# Patient Record
Sex: MALE | Race: WHITE | URBAN - METROPOLITAN AREA
[De-identification: names, ages, dates, MRNs, and addresses within clinical notes are randomized per-mention and may not be internally consistent; named-entity substitution may affect disease eponyms.]

---

## 2017-01-01 ENCOUNTER — OFFICE VISIT (OUTPATIENT)
Dept: NEUROLOGY | Age: 77
End: 2017-01-01

## 2017-01-01 ENCOUNTER — TELEPHONE (OUTPATIENT)
Dept: NEUROLOGY | Age: 77
End: 2017-01-01

## 2017-01-01 VITALS
BODY MASS INDEX: 27.74 KG/M2 | SYSTOLIC BLOOD PRESSURE: 124 MMHG | WEIGHT: 183 LBS | DIASTOLIC BLOOD PRESSURE: 70 MMHG | HEIGHT: 68 IN | OXYGEN SATURATION: 99 % | HEART RATE: 62 BPM | RESPIRATION RATE: 20 BRPM

## 2017-01-01 VITALS
BODY MASS INDEX: 27.02 KG/M2 | WEIGHT: 178.3 LBS | TEMPERATURE: 97.6 F | DIASTOLIC BLOOD PRESSURE: 82 MMHG | OXYGEN SATURATION: 99 % | SYSTOLIC BLOOD PRESSURE: 128 MMHG | HEART RATE: 56 BPM | RESPIRATION RATE: 18 BRPM | HEIGHT: 68 IN

## 2017-01-01 DIAGNOSIS — F02.80 DEMENTIA DUE TO PARKINSON'S DISEASE WITHOUT BEHAVIORAL DISTURBANCE (HCC): ICD-10-CM

## 2017-01-01 DIAGNOSIS — G20 PARKINSON'S DISEASE (HCC): Primary | ICD-10-CM

## 2017-01-01 DIAGNOSIS — G20 DEMENTIA DUE TO PARKINSON'S DISEASE WITHOUT BEHAVIORAL DISTURBANCE (HCC): ICD-10-CM

## 2017-01-01 RX ORDER — RIVASTIGMINE 13.3 MG/24H
1 PATCH, EXTENDED RELEASE TRANSDERMAL DAILY
Qty: 30 PATCH | Refills: 3 | Status: SHIPPED | OUTPATIENT
Start: 2017-01-01 | End: 2018-01-01 | Stop reason: SDUPTHER

## 2017-01-01 RX ORDER — RASAGILINE 1 MG/1
1 TABLET ORAL DAILY
Qty: 30 TAB | Refills: 3 | Status: SHIPPED | OUTPATIENT
Start: 2017-01-01 | End: 2017-01-01 | Stop reason: SDUPTHER

## 2017-01-01 RX ORDER — RASAGILINE 1 MG/1
1 TABLET ORAL DAILY
Qty: 30 TAB | Refills: 3 | Status: SHIPPED | OUTPATIENT
Start: 2017-01-01 | End: 2018-01-01 | Stop reason: SDUPTHER

## 2017-01-01 RX ORDER — RIVASTIGMINE 9.5 MG/24H
1 PATCH, EXTENDED RELEASE TRANSDERMAL DAILY
Qty: 30 PATCH | Refills: 3 | Status: CANCELLED | OUTPATIENT
Start: 2017-01-01

## 2017-01-01 RX ORDER — RIVASTIGMINE 13.3 MG/24H
1 PATCH, EXTENDED RELEASE TRANSDERMAL DAILY
Qty: 30 PATCH | Refills: 3 | Status: SHIPPED | OUTPATIENT
Start: 2017-01-01 | End: 2017-01-01 | Stop reason: SDUPTHER

## 2017-01-01 RX ORDER — CARBIDOPA AND LEVODOPA 25; 100 MG/1; MG/1
1 TABLET ORAL 3 TIMES DAILY
Qty: 90 TAB | Refills: 3 | Status: SHIPPED | OUTPATIENT
Start: 2017-01-01 | End: 2018-01-01 | Stop reason: SDUPTHER

## 2017-01-01 RX ORDER — CARBIDOPA AND LEVODOPA 25; 100 MG/1; MG/1
1 TABLET ORAL 3 TIMES DAILY
Qty: 90 TAB | Refills: 3 | Status: SHIPPED | OUTPATIENT
Start: 2017-01-01 | End: 2017-01-01 | Stop reason: SDUPTHER

## 2017-02-21 ENCOUNTER — OFFICE VISIT (OUTPATIENT)
Dept: NEUROLOGY | Age: 77
End: 2017-02-21

## 2017-02-21 VITALS
SYSTOLIC BLOOD PRESSURE: 112 MMHG | HEIGHT: 68 IN | WEIGHT: 185 LBS | BODY MASS INDEX: 28.04 KG/M2 | RESPIRATION RATE: 20 BRPM | OXYGEN SATURATION: 98 % | HEART RATE: 60 BPM | DIASTOLIC BLOOD PRESSURE: 78 MMHG

## 2017-02-21 DIAGNOSIS — F02.80 DEMENTIA DUE TO PARKINSON'S DISEASE WITHOUT BEHAVIORAL DISTURBANCE (HCC): ICD-10-CM

## 2017-02-21 DIAGNOSIS — G20 DEMENTIA DUE TO PARKINSON'S DISEASE WITHOUT BEHAVIORAL DISTURBANCE (HCC): ICD-10-CM

## 2017-02-21 DIAGNOSIS — G20 PARKINSON'S DISEASE (HCC): Primary | ICD-10-CM

## 2017-02-21 RX ORDER — RIVASTIGMINE 9.5 MG/24H
1 PATCH, EXTENDED RELEASE TRANSDERMAL DAILY
Qty: 30 PATCH | Refills: 3 | Status: SHIPPED | OUTPATIENT
Start: 2017-02-21 | End: 2017-01-01 | Stop reason: DRUGHIGH

## 2017-02-21 RX ORDER — RASAGILINE 1 MG/1
1 TABLET ORAL DAILY
Qty: 30 TAB | Refills: 3 | Status: SHIPPED | OUTPATIENT
Start: 2017-02-21 | End: 2017-06-21 | Stop reason: SDUPTHER

## 2017-02-21 RX ORDER — CARBIDOPA AND LEVODOPA 25; 100 MG/1; MG/1
1 TABLET ORAL 3 TIMES DAILY
Qty: 90 TAB | Refills: 3 | Status: SHIPPED | OUTPATIENT
Start: 2017-02-21 | End: 2017-01-01 | Stop reason: SDUPTHER

## 2017-02-21 RX ORDER — MEMANTINE HYDROCHLORIDE 28 MG/1
28 CAPSULE, EXTENDED RELEASE ORAL DAILY
Qty: 30 CAP | Refills: 3 | Status: SHIPPED | OUTPATIENT
Start: 2017-02-21 | End: 2017-01-01 | Stop reason: SINTOL

## 2017-02-21 NOTE — PATIENT INSTRUCTIONS

## 2017-02-21 NOTE — PROGRESS NOTES
He has been about the same since last visit as far as parkinson's disease, he is just a little slow   He has not had any falls he is very careful when he is ambulating, his depth perception is a little off he has to hold on to the railings when going up and down the steps   Dementia- sometimes it seems like he is a little more disoriented in the morning until he can get going and then he seems fine   Sometimes he thinks he remembers things really well, per his wife he has good days that he remembers and then he has bad days more complex things like the checkbook is harder for him to comprehend

## 2017-02-21 NOTE — MR AVS SNAPSHOT
Visit Information Date & Time Provider Department Dept. Phone Encounter #  
 2/21/2017 11:30 AM Lady Julien NP Neurology [facility] [phone] [id number] Follow-up Instructions Return in about 4 months (around 6/21/2017). Upcoming Health Maintenance Date Due DTaP/Tdap/Td series (1 - Tdap) 6/5/1963 FOBT Q 1 YEAR AGE 50-75 6/5/1992 ZOSTER VACCINE AGE 60> 6/5/2002 GLAUCOMA SCREENING Q2Y 6/5/2007 INFLUENZA AGE 9 TO ADULT 8/1/2016 Pneumococcal 65+ Low/Medium Risk (2 of 2 - PPSV23) 6/10/2017 MEDICARE YEARLY EXAM 6/11/2017 Allergies as of 2/21/2017  Review Complete On: 2/21/2017 By: Lady Julien NP No Known Allergies Current Immunizations  Never Reviewed Name Date Influenza Vaccine 11/12/2013 Pneumococcal Conjugate (PCV-13) 6/10/2016  3:02 PM  
  
 Not reviewed this visit You Were Diagnosed With   
  
 Codes Comments Parkinson's disease ([facility]    -  Primary ICD-10-CM: G20 
ICD-9-CM: 332.0 Dementia due to Parkinson's disease without behavioral disturbance ([facility]     ICD-10-CM: G20, F02.80 ICD-9-CM: 332.0, 294.10 Vitals BP Pulse Resp Height(growth percentile) Weight(growth percentile) SpO2  
 112/78 60 20 5' 8\" (1.727 m) 185 lb (83.9 kg) 98% BMI Smoking Status 28.13 kg/m2 Former Smoker Vitals History BMI and BSA Data Body Mass Index Body Surface Area  
 28.13 kg/m 2 2.01 m 2 Preferred Pharmacy [id number] [facility] [phone] Your Updated Medication List  
  
   
This list is accurate as of: 2/21/17 12:30 PM.  Always use your most recent med list. ALTACE 10 mg capsule Generic drug:  ramipril Take 10 mg by mouth daily. amLODIPine 5 mg tablet Commonly known as:  Joan Blade Take 5 mg by mouth daily. aspirin 325 mg tablet Commonly known as:  ASPIRIN Take 162 mg by mouth daily. carbidopa-levodopa  mg per tablet Commonly known as:  SINEMET Take 1 Tab by mouth three (3) times daily. At 8am, 12noon, 4pm  
  
 chlorthalidone 25 mg tablet Commonly known as:  Minor Median Take  by mouth daily. Take 1/2 tab daily LIPITOR 40 mg tablet Generic drug:  atorvastatin Take  by mouth daily. * memantine 7-14-21-28 mg C24k Commonly known as:  NAMENDA XR Take 1 Cap by mouth daily. * memantine ER 28 mg capsule Commonly known as:  NAMENDA XR Take 1 Cap by mouth daily. metoprolol tartrate 50 mg tablet Commonly known as:  LOPRESSOR 50 mg two (2) times a day. rasagiline 1 mg tablet Commonly known as:  AZILECT Take 1 Tab by mouth daily. rivastigmine 9.5 mg/24 hr patch Commonly known as:  EXELON  
1 Patch by TransDERmal route daily. * Notice: This list has 2 medication(s) that are the same as other medications prescribed for you. Read the directions carefully, and ask your doctor or other care provider to review them with you. Prescriptions Sent to Pharmacy Refills  
 carbidopa-levodopa (SINEMET)  mg per tablet 3 Sig: Take 1 Tab by mouth three (3) times daily. At 8am, 12noon, 4pm  
 Class: Normal  
 Pharmacy: [address] [place] [address] Ph #: [phone] Route: Oral  
 rivastigmine (EXELON) 9.5 mg/24 hr patch 3 Si Patch by TransDERmal route daily. Class: Normal  
 Pharmacy: [place] [address] Ph #: [phone] Route: TransDERmal  
 rasagiline (AZILECT) 1 mg tablet 3 Sig: Take 1 Tab by mouth daily. Class: Normal  
 Pharmacy: [place] [address] Ph #: [phone] Route: Oral  
 memantine (NAMENDA XR) 7-14-21-28 mg C24k 0 Sig: Take 1 Cap by mouth daily. Class: Normal  
 Pharmacy: [place] [address] Ph #: [phone] Route: Oral  
 memantine ER (NAMENDA XR) 28 mg capsule 3 Sig: Take 1 Cap by mouth daily. Class: Normal  
 Pharm[facility]: [facility] [address]  #: [phone] Route: Oral  
  
Follow-up Instructions Return in about 4 months (around 6/21/2017). Patient Instructions A Healthy Lifestyle: Care Instructions Your Care Instructions A healthy lifestyle can help you feel good, stay at a healthy weight, and have plenty of energy for both work and play. A healthy lifestyle is something you can share with your whole family. A healthy lifestyle also can lower your risk for serious health problems, such as high blood pressure, heart disease, and diabetes. You can follow a few steps listed below to improve your health and the health of your family. Follow-up care is a key part of your treatment and safety. Be sure to make and go to all appointments, and call your doctor if you are having problems. Its also a good idea to know your test results and keep a list of the medicines you take. How can you care for yourself at home? · Do not eat too much sugar, fat, or fast foods. You can still have dessert and treats now and then. The goal is moderation. · Start small to improve your eating habits. Pay attention to portion sizes, drink less juice and soda pop, and eat more fruits and vegetables. ¨ Eat a healthy amount of food. A 3-ounce serving of meat, for example, is about the size of a deck of cards. Fill the rest of your plate with vegetables and whole grains. ¨ Limit the amount of soda and sports drinks you have every day. Drink more water when you are thirsty. ¨ Eat at least 5 servings of fruits and vegetables every day. It may seem like a lot, but it is not hard to reach this goal. A serving or helping is 1 piece of fruit, 1 cup of vegetables, or 2 cups of leafy, raw vegetables.  Have an apple or some carrot sticks as an afternoon snack instead of a candy bar. Try to have fruits and/or vegetables at every meal. 
· Make exercise part of your daily routine. You may want to start with simple activities, such as walking, bicycling, or slow swimming. Try to be active 30 to 60 minutes every day. You do not need to do all 30 to 60 minutes all at once. For example, you can exercise 3 times a day for 10 or 20 minutes. Moderate exercise is safe for most people, but it is always a good idea to talk to your doctor before starting an exercise program. 
· Keep moving. [place] Feeling the lawn, work in the garden, or [employer]. Take the stairs instead of the elevator at work. · If you smoke, quit. People who smoke have an increased risk for heart attack, stroke, cancer, and other lung illnesses. Quitting is hard, but there are ways to boost your chance of quitting tobacco for good. ¨ Use nicotine gum, patches, or lozenges. ¨ Ask your doctor about stop-smoking programs and medicines. ¨ Keep trying. In addition to reducing your risk of diseases in the future, you will notice some benefits soon after you stop using tobacco. If you have shortness of breath or asthma symptoms, they will likely get better within a few weeks after you quit. · Limit how much alcohol you drink. Moderate amounts of alcohol (up to 2 drinks a day for men, 1 drink a day for women) are okay. But drinking too much can lead to liver problems, high blood pressure, and other health problems. Family health If you have a family, there are many things you can do together to improve your health. · Eat meals together as a family as often as possible. · Eat healthy foods. This includes fruits, vegetables, lean meats and dairy, and whole grains. · Include your family in your fitness plan. Most people think of activities such as jogging or tennis as the way to fitness, but there are many ways you and your family can be more active.  Anything that makes you breathe hard and gets your heart pumping is exercise. Here are some tips: 
¨ Walk to do errands or to take your child to school or the bus. ¨ Go for a family bike ride after dinner instead of watching TV. Where can you learn more? Go to http://cassie-kevin.info/. Enter H507 in the search box to learn more about \"A Healthy Lifestyle: Care Instructions. \" Current as of: July 26, 2016 Content Version: 11.1 © 5341-5547 [employer]. Care instructions adapted under license by [employer] (which disclaims liability or warranty for this information). If you have questions about a medical condition or this instruction, always ask your healthcare professional. [address] any warranty or liability for your use of this information. Introducing [facility]! [employer] introduces [employer] patient portal. Now you can access parts of your medical record, email your doctor's office, and request medication refills online. 1. In your internet browser, go to [email or web address] 2. Click on the First Time User? Click Here link in the Sign In box. You will see the New Member Sign Up page. 3. Enter your [employer] Access Code exactly as it appears below. You will not need to use this code after youve completed the sign-up process. If you do not sign up before the expiration date, you must request a new code. · [employer] Access Code: [email or web address] Expires: 5/22/2017 12:30 PM 
 
4. Enter the last four digits of your Social Security Number (xxxx) and Date of Birth (mm/dd/yyyy) as indicated and click Submit. You will be taken to the next sign-up page. 5. Create a [employer] ID. This will be your [employer] login ID and cannot be changed, so think of one that is secure and easy to remember. 6. Create a [employer] password. You can change your password at any time. 7. Enter your Password Reset Question and Answer.  This can be used at a later time if you forget your password. 8. Enter your e-mail address. You will receive e-mail notification when new information is available in [address] 9. Click Sign Up. You can now view and download portions of your medical record. 10. Click the Download Summary menu link to download a portable copy of your medical information. If you have questions, please visit the Frequently Asked Questions section of the [employer] website. Remember, [employer] is NOT to be used for urgent needs. For medical emergencies, dial 911. Now available from your iPhone and Android! Please provide this summary of care documentation to your next provider. Your primary care clinician is listed as Gwendolyn Mcgrath. If you have any questions after today's visit, please call [phone].
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
## 2017-03-24 ENCOUNTER — TELEPHONE (OUTPATIENT)
Dept: NEUROLOGY | Age: 77
End: 2017-03-24

## 2017-03-27 NOTE — TELEPHONE ENCOUNTER
Pt's wife called and stated that the patient went a head and stopped taking the Namenda because it was making him feel dizzy and not well. He stopped taking the medication around the 16th of this month. He has gotten better and feels better. She just wanted to let you know.

## 2017-06-13 ENCOUNTER — TELEPHONE (OUTPATIENT)
Dept: NEUROLOGY | Age: 77
End: 2017-06-13

## 2017-06-13 NOTE — TELEPHONE ENCOUNTER
----- Message from Page Peralta sent at 6/13/2017  3:57 PM EDT -----  Regarding: AYAD Borrero/Telephone  Contact: [phone]  Kristie Proper spouse, Aldair Ash called requesting to have the DMV forms completed, they will be faxed to the office 6.14.17.  She also wants her  to be seen regarding Parkinson's prior to them leaving town 6.26.17

## 2017-06-14 ENCOUNTER — TELEPHONE (OUTPATIENT)
Dept: NEUROLOGY | Age: 77
End: 2017-06-14

## 2017-06-21 DIAGNOSIS — G20 DEMENTIA DUE TO PARKINSON'S DISEASE WITHOUT BEHAVIORAL DISTURBANCE (HCC): ICD-10-CM

## 2017-06-21 DIAGNOSIS — F02.80 DEMENTIA DUE TO PARKINSON'S DISEASE WITHOUT BEHAVIORAL DISTURBANCE (HCC): ICD-10-CM

## 2017-06-21 DIAGNOSIS — G20 PARKINSON'S DISEASE (HCC): ICD-10-CM

## 2017-06-21 RX ORDER — RASAGILINE 1 MG/1
1 TABLET ORAL DAILY
Qty: 30 TAB | Refills: 3 | Status: SHIPPED | OUTPATIENT
Start: 2017-06-21 | End: 2017-01-01 | Stop reason: SDUPTHER

## 2017-07-05 ENCOUNTER — TELEPHONE (OUTPATIENT)
Dept: NEUROLOGY | Age: 77
End: 2017-07-05

## 2017-07-05 NOTE — TELEPHONE ENCOUNTER
----- Message from Paty Colvin sent at 7/5/2017 12:23 PM EDT -----  Regarding: Edward/Telephone  Pt's [facility] Kayla [facility]anco [facility] an appointment Julee scheduled for the pt and lowell would like to discuss that , the appointment Is for tomorrow.  Best contact [phone]

## 2017-07-19 NOTE — PROGRESS NOTES
I have reviewed the documentation provided by the nurse practitioner, KATIE Burks, and we have discussed her findings. Additionally,  I have personally evaluated the patient to verify the history and to confirm physical findings. Below are my additional comments: In discussing his condition with his family, it seems the memory is a bit worse. Will increase the Exelon Patch to 13.3mg. Unfortunately, he did not tolerate the Namenda and will need to be maintained on monotherapy. His movement issues are about the same and he will continue with his present Sinemet and Azilect. Follow up in three months. Lizzy Barry Frames
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
## 2017-07-19 NOTE — MR AVS SNAPSHOT
Visit Information Date & Time Provider Department Dept. Phone Encounter #  
 7/19/2017  2:00 PM Cherelle Markham NP [facility] [phone] [id number] Follow-up Instructions Return in about 3 months (around 10/19/2017). Your Appointments 7/28/2017  3:30 PM  
Follow Up with Cherelle Markham NP [facility] ([address]) Appt Note: f/up parkinsons' disease cr $0Cp; f/up parkinsons' disease cr $0Cp; f/up parkinsons' disease cr $0Cp  
 [place] [address] [address] [id number] [phone]  
  
   
 [facility] [id number] [address] [place] Upcoming Health Maintenance Date Due DTaP/Tdap/Td series (1 - Tdap) 6/5/1963 ZOSTER VACCINE AGE 60> 6/5/2002 GLAUCOMA SCREENING Q2Y 6/5/2007 Pneumococcal 65+ Low/Medium Risk (2 of 2 - PPSV23) 6/10/2017 MEDICARE YEARLY EXAM 6/11/2017 INFLUENZA AGE 9 TO ADULT 8/1/2017 Allergies as of 7/19/2017  Review Complete On: 7/19/2017 By: Cherelle Markham NP No Known Allergies Current Immunizations  Never Reviewed Name Date Influenza Vaccine 11/12/2013 Pneumococcal Conjugate (PCV-13) 6/10/2016  3:02 PM  
  
 Not reviewed this visit You Were Diagnosed With   
  
 Codes Comments [facility]'s disease ([facility]    -  Primary ICD-10-CM: G20 
ICD-9-CM: 332.0 Dementia due to Parkinson's disease without behavioral disturbance ([facility] [facility]     ICD-10-CM: G20, F02.80 ICD-9-CM: 332.0, 294.10 Vitals BP Pulse Resp Height(growth percentile) Weight(growth percentile) SpO2  
 124/70 62 20 5' 8\" (1.727 m) 183 lb (83 kg) 99% BMI Smoking Status 27.83 kg/m2 Former Smoker Vitals History BMI and BSA Data Body Mass Index Body Surface Area  
 27.83 kg/m 2 2 m 2 Preferred Pharmacy [phone] [facility] [phone] Your Updated Medication List  
  
   
This list is accurate as of: 17  4:28 PM.  Always use your most recent med list. ALTACE 10 mg capsule Generic drug:  ramipril Take 10 mg by mouth daily. amLODIPine 5 mg tablet Commonly known as:  Harlon Doyne Take 5 mg by mouth daily. aspirin 325 mg tablet Commonly known as:  ASPIRIN Take 162 mg by mouth daily. carbidopa-levodopa  mg per tablet Commonly known as:  SINEMET Take 1 Tab by mouth three (3) times daily. At 8am, 12noon, 4pm  
  
 chlorthalidone 25 mg tablet Commonly known as:  Dorothy [place] Take  by mouth daily. Take 1/2 tab daily LIPITOR 40 mg tablet Generic drug:  atorvastatin Take  by mouth daily. metoprolol tartrate 50 mg tablet Commonly known as:  LOPRESSOR 50 mg two (2) times a day. rasagiline 1 mg tablet Commonly known as:  AZILECT Take 1 Tab by mouth daily. rivastigmine 13.3 mg/24 hour patch Commonly known as:  EXELON  
1 Patch by TransDERmal route daily. Prescriptions Sent to Pharmacy Refills  
 rasagiline (AZILECT) 1 mg tablet 3 Sig: Take 1 Tab by mouth daily. Class: Normal  
 Pharmacy: [facility] [id number] [place] [address] Ph #: [phone] Route: Oral  
 carbidopa-levodopa (SINEMET)  mg per tablet 3 Sig: Take 1 Tab by mouth three (3) times daily. At 8am, 12noon, 4pm  
 Class: Normal  
 Pharmacy: [address] [place] [address] Ph #: [phone] Route: Oral  
 rivastigmine (EXELON) 13.3 mg/24 hour patch 3 Si Patch by TransDERmal route daily. Class: Normal  
 Pharmacy: [facility] [id number] [place] [address] Ph #: [phone] Route: TransDERmal  
  
Follow-up Instructions Return in about 3 months (around 10/19/2017). Patient Instructions A Healthy Lifestyle: Care Instructions Your Care Instructions A healthy lifestyle can help you feel good, stay at a healthy weight, and have plenty of energy for both work and play. A healthy lifestyle is something you can share with your whole family. A healthy lifestyle also can lower your risk for serious health problems, such as high blood pressure, heart disease, and diabetes. You can follow a few steps listed below to improve your health and the health of your family. Follow-up care is a key part of your treatment and safety. Be sure to make and go to all appointments, and call your doctor if you are having problems. Its also a good idea to know your test results and keep a list of the medicines you take. How can you care for yourself at home? · Do not eat too much sugar, fat, or fast foods. You can still have dessert and treats now and then. The goal is moderation. · Start small to improve your eating habits. Pay attention to portion sizes, drink less juice and soda pop, and eat more fruits and vegetables. ¨ Eat a healthy amount of food. A 3-ounce serving of meat, for example, is about the size of a deck of cards. Fill the rest of your plate with vegetables and whole grains. ¨ Limit the amount of soda and sports drinks you have every day. Drink more water when you are thirsty. ¨ Eat at least 5 servings of fruits and vegetables every day. It may seem like a lot, but it is not hard to reach this goal. A serving or helping is 1 piece of fruit, 1 cup of vegetables, or 2 cups of leafy, raw vegetables. Have an apple or some carrot sticks as an afternoon snack instead of a candy bar. Try to have fruits and/or vegetables at every meal. 
· Make exercise part of your daily routine. You may want to start with simple activities, such as walking, bicycling, or slow swimming. Try to be active 30 to 60 minutes every day. You do not need to do all 30 to 60 minutes all at once.  For example, you can exercise 3 times a day for 10 or 20 minutes. Moderate exercise is safe for most people, but it is always a good idea to talk to your doctor before starting an exercise program. 
· Keep moving. Patric Blotter the lawn, work in the garden, or [employer]. Take the stairs instead of the elevator at work. · If you smoke, quit. People who smoke have an increased risk for heart attack, stroke, cancer, and other lung illnesses. Quitting is hard, but there are ways to boost your chance of quitting tobacco for good. ¨ Use nicotine gum, patches, or lozenges. ¨ Ask your doctor about stop-smoking programs and medicines. ¨ Keep trying. In addition to reducing your risk of diseases in the future, you will notice some benefits soon after you stop using tobacco. If you have shortness of breath or asthma symptoms, they will likely get better within a few weeks after you quit. · Limit how much alcohol you drink. Moderate amounts of alcohol (up to 2 drinks a day for men, 1 drink a day for women) are okay. But drinking too much can lead to liver problems, high blood pressure, and other health problems. Family health If you have a family, there are many things you can do together to improve your health. · Eat meals together as a family as often as possible. · Eat healthy foods. This includes fruits, vegetables, lean meats and dairy, and whole grains. · Include your family in your fitness plan. Most people think of activities such as jogging or tennis as the way to fitness, but there are many ways you and your family can be more active. Anything that makes you breathe hard and gets your heart pumping is exercise. Here are some tips: 
¨ Walk to do errands or to take your child to school or the bus. ¨ Go for a family bike ride after dinner instead of watching TV. Where can you learn more? Go to http://cassie-kevin.info/. Enter B329 in the search box to learn more about \"A Healthy Lifestyle: Care Instructions. \" Current as of: July 26, 2016 Content Version: 11.3 © 3291-4462 [employer]. Care instructions adapted under license by [employer] (which disclaims liability or warranty for this information). If you have questions about a medical condition or this instruction, always ask your healthcare professional. [address] any warranty or liability for your use of this information. Introducing [facility] HE[facility]! Garcia Carlyle introduces [employer] patient portal. Now you can access parts of your medical record, email your doctor's office, and request medication refills online. 1. In your internet browser, go to [email or web address] 2. Click on the First Time User? Click Here link in the Sign In box. You will see the New Member Sign Up page. 3. Enter your [employer] Access Code exactly as it appears below. You will not need to use this code after youve completed the sign-up process. If you do not sign up before the expiration date, you must request a new code. · [employer] Access Code: [email or web address] Expires: 10/17/2017  4:28 PM 
 
4. Enter the last four digits of your Social Security Number (xxxx) and Date of Birth (mm/dd/yyyy) as indicated and click Submit. You will be taken to the next sign-up page. 5. Create a [employer] ID. This will be your [employer] login ID and cannot be changed, so think of one that is secure and easy to remember. 6. Create a [employer] password. You can change your password at any time. 7. Enter your Password Reset Question and Answer. This can be used at a later time if you forget your password. 8. Enter your e-mail address. You will receive e-mail notification when new information is available in [address] 9. Click Sign Up. You can now view and download portions of your medical record. 10. Click the Download Summary menu link to download a portable copy of your medical information. If you have questions, please visit the Frequently Asked Questions section of the [employer] website. Remember, zlien is NOT to be used for urgent needs. For medical emergencies, dial 911. Now available from your iPhone and Android! Please provide this summary of care documentation to your next provider. Your primary care clinician is listed as Ministerio Corona. If you have any questions after today's visit, please call [phone].

## 2017-07-19 NOTE — PROGRESS NOTES
Parkinson's disease- he stated he guesses he was doing okay   He is doing well with mobility, he does go to a parkinsons' activity 3 days a week that is geared towards parkinson's patients and exercises   Nothing new to report at this time

## 2017-07-19 NOTE — PATIENT INSTRUCTIONS

## 2017-10-17 NOTE — MR AVS SNAPSHOT
Visit Information Date & Time Provider Department Dept. Phone Encounter #  
 10/17/2017 11:30 AM [facility] [phone] [id number] Follow-up Instructions Return in about 4 months (around 2/17/2018). Upcoming Health Maintenance Date Due DTaP/Tdap/Td series (1 - Tdap) 6/5/1963 ZOSTER VACCINE AGE 60> 4/5/2002 GLAUCOMA SCREENING Q2Y 6/5/2007 Pneumococcal 65+ Low/Medium Risk (2 of 2 - PPSV23) 6/10/2017 MEDICARE YEARLY EXAM 6/11/2017 INFLUENZA AGE 9 TO ADULT 8/1/2017 Allergies as of 10/17/2017  Review Complete On: 10/17/2017 By: Bonilla Dumont NP No Known Allergies Current Immunizations  Never Reviewed Name Date Influenza Vaccine 11/12/2013 Pneumococcal Conjugate (PCV-13) 6/10/2016  3:02 PM  
  
 Not reviewed this visit You Were Diagnosed With   
  
 Codes Comments Parkinson's disease ([facility]    -  Primary ICD-10-CM: G20 
ICD-9-CM: 332.0 Dementia due to Parkinson's disease without behavioral disturbance ([facility]     ICD-10-CM: G20, F02.80 ICD-9-CM: 332.0, 294.10 Vitals BP Pulse Temp Resp Height(growth percentile) Weight(growth percentile) 128/82 (BP 1 Location: Left arm, BP Patient Position: Sitting) (!) 56 97.6 °F (36.4 °C) (Oral) 18 5' 8\" (1.727 m) 178 lb 4.8 oz (80.9 kg) SpO2 BMI Smoking Status 99% 27.11 kg/m2 Former Smoker Vitals History BMI and BSA Data Body Mass Index Body Surface Area  
 27.11 kg/m 2 1.97 m 2 Preferred Pharmacy Pharmacy Name Phone [address] [place] [address] [phone] Your Updated Medication List  
  
   
This list is accurate as of: 10/17/17 12:21 PM.  Always use your most recent med list. ALTACE 10 mg capsule Generic drug:  ramipril Take 10 mg by mouth daily. amLODIPine 5 mg tablet Commonly known as:  Karen Crowder Take 5 mg by mouth daily. aspirin 325 mg tablet Commonly known as:  ASPIRIN Take 162 mg by mouth daily. carbidopa-levodopa  mg per tablet Commonly known as:  SINEMET Take 1 Tab by mouth three (3) times daily. At 8am, 12noon, 4pm  
  
 chlorthalidone 25 mg tablet Commonly known as:  Brenda Stakes Take  by mouth daily. Take 1/2 tab daily LIPITOR 40 mg tablet Generic drug:  atorvastatin Take  by mouth daily. metoprolol tartrate 50 mg tablet Commonly known as:  LOPRESSOR 50 mg two (2) times a day. phosphatidylse-omega-3-dha-epa 100-19.5-6.5 mg Cap Commonly known as:  VAYACOG Take 1 Cap by mouth daily. rasagiline 1 mg tablet Commonly known as:  AZILECT Take 1 Tab by mouth daily. rivastigmine 13.3 mg/24 hour patch Commonly known as:  EXELON  
1 Patch by TransDERmal route daily. Prescriptions Sent to Pharmacy Refills  
 rasagiline (AZILECT) 1 mg tablet 3 Sig: Take 1 Tab by mouth daily. Class: Normal  
 Pharmacy: [place] [id number] [place] [address] Ph #: [phone] Route: Oral  
 carbidopa-levodopa (SINEMET)  mg per tablet 3 Sig: Take 1 Tab by mouth three (3) times daily. At 8am, 12noon, 4pm  
 Class: Normal  
 Pharmacy: [address] [place] [address] Ph #: [phone] Route: Oral  
 rivastigmine (EXELON) 13.3 mg/24 hour patch 3 Si Patch by TransDERmal route daily. Class: Normal  
 Pharmacy: [place] [id number] [place] [address] Ph #: [phone] Route: TransDERmal  
 phosphatidylse-omega-3-dha-epa (VAYACOG) 100-19.5-6.5 mg cap 3 Sig: Take 1 Cap by mouth daily. Class: Normal  
 Pharmacy: [place] [id number] Ph #: [phone] Route: Oral  
  
Follow-up Instructions Return in about 4 months (around 2018). Patient Instructions Gaston Melgar [facility] What is a living will? A living will is a legal form you use to write down the kind of care you want at the end of your life. It is used by the health professionals who will treat you if you aren't able to decide for yourself. If you put your wishes in writing, your loved ones and others will know what kind of care you want. They won't need to guess. This can ease your mind and be helpful to others. A living will is not the same as an estate or property will. An estate will explains what you want to happen with your money and property after you die. Is a living will a legal document? A living will is a legal document. Each state has its own laws about living murry. If you move to another state, make sure that your living will is legal in the state where you now live. Or you might use a universal form that has been approved by many states. This kind of form can sometimes be completed and stored online. Your electronic copy will then be available wherever you have a connection to the Internet. In most cases, doctors will respect your wishes even if you have a form from a different state. · You don't need an  to complete a living will. But legal advice can be helpful if your state's laws are unclear, your health history is complicated, or your family can't agree on what should be in your living will. · You can change your living will at any time. Some people find that their wishes about end-of-life care change as their health changes. · In addition to making a living will, think about completing a medical power of  form. This form lets you name the person you want to make end-of-life treatment decisions for you (your \"health care agent\") if you're not able to. Many hospitals and nursing homes will give you the forms you need to complete a living will and a medical power of .  
· Your living will is used only if you can't make or communicate decisions for yourself anymore. If you become able to make decisions again, you can accept or refuse any treatment, no matter what you wrote in your living will. · Your state may offer an online registry. This is a place where you can store your living will online so the doctors and nurses who need to treat you can find it right away. What should you think about when creating a living will? Talk about your end-of-life wishes with your family members and your doctor. Let them know what you want. That way the people making decisions for you won't be surprised by your choices. Think about these questions as you make your living will: · Do you know enough about life support methods that might be used? If not, talk to your doctor so you know what might be done if you can't breathe on your own, your heart stops, or you're unable to swallow. · What things would you still want to be able to do after you receive life-support methods? Would you want to be able to walk? To speak? To eat on your own? To live without the help of machines? · If you have a choice, where do you want to be cared for? In your home? At a hospital or nursing home? · Do you want certain Sikh practices performed if you become very ill? · If you have a choice at the end of your life, where would you prefer to die? At home? In a hospital or nursing home? Somewhere else? · Would you prefer to be buried or cremated? · Do you want your organs to be donated after you die? What should you do with your living will? · Make sure that your family members and your health care agent have copies of your living will. · Give your doctor a copy of your living will to keep in your medical record. If you have more than one doctor, make sure that each one has a copy. · You may want to put a copy of your living will where it can be easily found. Where can you learn more? Go to http://cassie-kevin.info/. Enter E882 in the search box to learn more about \"Learning About Living Shari. \" Current as of: August 8, 2016 Content Version: 11.3 © 3653-4271 R2integrated, Incorporated. Care instructions adapted under license by [employer] (which disclaims liability or warranty for this information). If you have questions about a medical condition or this instruction, always ask your healthcare professional. [address] any warranty or liability for your use of this information. Introducing [facility] & [facility]! [employer] introduces Hiveoo patient portal. Now you can access parts of your medical record, email your doctor's office, and request medication refills online. 1. In your internet browser, go to [email or web address] 2. Click on the First Time User? Click Here link in the Sign In box. You will see the New Member Sign Up page. 3. Enter your Hiveoo Access Code exactly as it appears below. You will not need to use this code after youve completed the sign-up process. If you do not sign up before the expiration date, you must request a new code. · Hiveoo Access Code: [email or web address] Expires: 10/17/2017  4:28 PM 
 
4. Enter the last four digits of your Social Security Number (xxxx) and Date of Birth (mm/dd/yyyy) as indicated and click Submit. You will be taken to the next sign-up page. 5. Create a Hiveoo ID. This will be your Hiveoo login ID and cannot be changed, so think of one that is secure and easy to remember. 6. Create a Hiveoo password. You can change your password at any time. 7. Enter your Password Reset Question and Answer. This can be used at a later time if you forget your password. 8. Enter your e-mail address. You will receive e-mail notification when new information is available in [address] 9. Click Sign Up. You can now view and download portions of your medical record. 10. Click the Download Summary menu link to download a portable copy of your medical information. If you have questions, please visit the Frequently Asked Questions section of the [email or web address] website. Remember, Splinter.me is NOT to be used for urgent needs. For medical emergencies, dial 911. Now available from your iPhone and Android! Please provide this summary of care documentation to your next provider. Your primary care clinician is listed as Rachel Fajardo. If you have any questions after today's visit, please call [phone].

## 2017-10-17 NOTE — PATIENT INSTRUCTIONS
Learning About Living Shari  What is a living will? A living will is a legal form you use to write down the kind of care you want at the end of your life. It is used by the health professionals who will treat you if you aren't able to decide for yourself. If you put your wishes in writing, your loved ones and others will know what kind of care you want. They won't need to guess. This can ease your mind and be helpful to others. A living will is not the same as an estate or property will. An estate will explains what you want to happen with your money and property after you die. Is a living will a legal document? A living will is a legal document. Each state has its own laws about living murry. If you move to another state, make sure that your living will is legal in the state where you now live. Or you might use a universal form that has been approved by many states. This kind of form can sometimes be completed and stored online. Your electronic copy will then be available wherever you have a connection to the Internet. In most cases, doctors will respect your wishes even if you have a form from a different state. · You don't need an  to complete a living will. But legal advice can be helpful if your state's laws are unclear, your health history is complicated, or your family can't agree on what should be in your living will. · You can change your living will at any time. Some people find that their wishes about end-of-life care change as their health changes. · In addition to making a living will, think about completing a medical power of  form. This form lets you name the person you want to make end-of-life treatment decisions for you (your \"health care agent\") if you're not able to. Many hospitals and nursing homes will give you the forms you need to complete a living will and a medical power of .   · Your living will is used only if you can't make or communicate decisions for yourself anymore. If you become able to make decisions again, you can accept or refuse any treatment, no matter what you wrote in your living will. · Your state may offer an online registry. This is a place where you can store your living will online so the doctors and nurses who need to treat you can find it right away. What should you think about when creating a living will? Talk about your end-of-life wishes with your family members and your doctor. Let them know what you want. That way the people making decisions for you won't be surprised by your choices. Think about these questions as you make your living will:  · Do you know enough about life support methods that might be used? If not, talk to your doctor so you know what might be done if you can't breathe on your own, your heart stops, or you're unable to swallow. · What things would you still want to be able to do after you receive life-support methods? Would you want to be able to walk? To speak? To eat on your own? To live without the help of machines? · If you have a choice, where do you want to be cared for? In your home? At a hospital or nursing home? · Do you want certain Jew practices performed if you become very ill? · If you have a choice at the end of your life, where would you prefer to die? At home? In a hospital or nursing home? Somewhere else? · Would you prefer to be buried or cremated? · Do you want your organs to be donated after you die? What should you do with your living will? · Make sure that your family members and your health care agent have copies of your living will. · Give your doctor a copy of your living will to keep in your medical record. If you have more than one doctor, make sure that each one has a copy. · You may want to put a copy of your living will where it can be easily found. Where can you learn more? Go to http://cassie-kevin.info/.   Enter V658 in the search box to learn more about \"Learning About Living Perroy. \"  Current as of: August 8, 2016  Content Version: 11.3  © 6543-2268 Intacct, Incorporated. Care instructions adapted under license by EmpowrNet (which disclaims liability or warranty for this information). If you have questions about a medical condition or this instruction, always ask your healthcare professional. [address] any warranty or liability for your use of this information.

## 2018-01-01 ENCOUNTER — OFFICE VISIT (OUTPATIENT)
Dept: FAMILY MEDICINE CLINIC | Age: 78
End: 2018-01-01

## 2018-01-01 ENCOUNTER — TELEPHONE (OUTPATIENT)
Dept: FAMILY MEDICINE CLINIC | Age: 78
End: 2018-01-01

## 2018-01-01 ENCOUNTER — OFFICE VISIT (OUTPATIENT)
Dept: NEUROLOGY | Age: 78
End: 2018-01-01

## 2018-01-01 ENCOUNTER — DOCUMENTATION ONLY (OUTPATIENT)
Dept: FAMILY MEDICINE CLINIC | Age: 78
End: 2018-01-01

## 2018-01-01 VITALS
RESPIRATION RATE: 16 BRPM | OXYGEN SATURATION: 94 % | HEART RATE: 56 BPM | SYSTOLIC BLOOD PRESSURE: 94 MMHG | WEIGHT: 179 LBS | HEIGHT: 68 IN | DIASTOLIC BLOOD PRESSURE: 44 MMHG | BODY MASS INDEX: 27.13 KG/M2

## 2018-01-01 VITALS
BODY MASS INDEX: 27.68 KG/M2 | DIASTOLIC BLOOD PRESSURE: 80 MMHG | TEMPERATURE: 97.8 F | SYSTOLIC BLOOD PRESSURE: 130 MMHG | WEIGHT: 182.6 LBS | RESPIRATION RATE: 20 BRPM | OXYGEN SATURATION: 98 % | HEIGHT: 68 IN | HEART RATE: 57 BPM

## 2018-01-01 VITALS
HEIGHT: 68 IN | RESPIRATION RATE: 20 BRPM | SYSTOLIC BLOOD PRESSURE: 104 MMHG | HEART RATE: 55 BPM | BODY MASS INDEX: 27.13 KG/M2 | WEIGHT: 179 LBS | DIASTOLIC BLOOD PRESSURE: 68 MMHG | OXYGEN SATURATION: 96 %

## 2018-01-01 VITALS
DIASTOLIC BLOOD PRESSURE: 88 MMHG | WEIGHT: 181 LBS | SYSTOLIC BLOOD PRESSURE: 192 MMHG | BODY MASS INDEX: 27.43 KG/M2 | HEART RATE: 96 BPM | HEIGHT: 68 IN | RESPIRATION RATE: 16 BRPM | OXYGEN SATURATION: 96 % | TEMPERATURE: 98.2 F

## 2018-01-01 VITALS
DIASTOLIC BLOOD PRESSURE: 68 MMHG | RESPIRATION RATE: 20 BRPM | BODY MASS INDEX: 27.72 KG/M2 | HEIGHT: 68 IN | OXYGEN SATURATION: 97 % | HEART RATE: 50 BPM | WEIGHT: 182.9 LBS | SYSTOLIC BLOOD PRESSURE: 134 MMHG

## 2018-01-01 DIAGNOSIS — I48.20 CHRONIC ATRIAL FIBRILLATION (HCC): Primary | ICD-10-CM

## 2018-01-01 DIAGNOSIS — R25.9 PARKINSONIAN FEATURES: ICD-10-CM

## 2018-01-01 DIAGNOSIS — I10 ESSENTIAL HYPERTENSION: ICD-10-CM

## 2018-01-01 DIAGNOSIS — F02.80 DEMENTIA DUE TO PARKINSON'S DISEASE WITHOUT BEHAVIORAL DISTURBANCE (HCC): ICD-10-CM

## 2018-01-01 DIAGNOSIS — Z00.00 MEDICARE ANNUAL WELLNESS VISIT, SUBSEQUENT: ICD-10-CM

## 2018-01-01 DIAGNOSIS — J11.00 INFLUENZA AND PNEUMONIA: ICD-10-CM

## 2018-01-01 DIAGNOSIS — R25.9 PARKINSONIAN FEATURES: Primary | ICD-10-CM

## 2018-01-01 DIAGNOSIS — I10 ESSENTIAL HYPERTENSION: Primary | ICD-10-CM

## 2018-01-01 DIAGNOSIS — R35.0 URINE FREQUENCY: ICD-10-CM

## 2018-01-01 DIAGNOSIS — G20 PARKINSON'S DISEASE (HCC): ICD-10-CM

## 2018-01-01 DIAGNOSIS — R41.3 MEMORY CHANGES: Primary | ICD-10-CM

## 2018-01-01 DIAGNOSIS — G20 PARKINSON'S DISEASE (HCC): Primary | ICD-10-CM

## 2018-01-01 DIAGNOSIS — G20 DEMENTIA DUE TO PARKINSON'S DISEASE WITHOUT BEHAVIORAL DISTURBANCE (HCC): ICD-10-CM

## 2018-01-01 DIAGNOSIS — E53.8 CYANOCOBALAMIN DEFICIENCY: ICD-10-CM

## 2018-01-01 DIAGNOSIS — R42 VERTIGO: ICD-10-CM

## 2018-01-01 LAB
ALBUMIN SERPL-MCNC: 4 G/DL (ref 3.5–4.8)
ALBUMIN/GLOB SERPL: 1.9 {RATIO} (ref 1.2–2.2)
ALP SERPL-CCNC: 87 IU/L (ref 39–117)
ALT SERPL-CCNC: 14 IU/L (ref 0–44)
AST SERPL-CCNC: 16 IU/L (ref 0–40)
BASOPHILS # BLD AUTO: 0 X10E3/UL (ref 0–0.2)
BASOPHILS NFR BLD AUTO: 0 %
BILIRUB SERPL-MCNC: 0.5 MG/DL (ref 0–1.2)
BILIRUB UR QL STRIP: NEGATIVE
BUN SERPL-MCNC: 21 MG/DL (ref 8–27)
BUN/CREAT SERPL: 23 (ref 10–24)
CALCIUM SERPL-MCNC: 9 MG/DL (ref 8.6–10.2)
CHLORIDE SERPL-SCNC: 104 MMOL/L (ref 96–106)
CO2 SERPL-SCNC: 25 MMOL/L (ref 18–29)
CREAT SERPL-MCNC: 0.9 MG/DL (ref 0.76–1.27)
EOSINOPHIL # BLD AUTO: 0.2 X10E3/UL (ref 0–0.4)
EOSINOPHIL NFR BLD AUTO: 3 %
ERYTHROCYTE [DISTWIDTH] IN BLOOD BY AUTOMATED COUNT: 14.2 % (ref 12.3–15.4)
GFR SERPLBLD CREATININE-BSD FMLA CKD-EPI: 83 ML/MIN/1.73
GFR SERPLBLD CREATININE-BSD FMLA CKD-EPI: 96 ML/MIN/1.73
GLOBULIN SER CALC-MCNC: 2.1 G/DL (ref 1.5–4.5)
GLUCOSE SERPL-MCNC: 71 MG/DL (ref 65–99)
GLUCOSE UR-MCNC: NEGATIVE MG/DL
HCT VFR BLD AUTO: 44.9 % (ref 37.5–51)
HGB BLD-MCNC: 14.5 G/DL (ref 13–17.7)
IMM GRANULOCYTES # BLD: 0 X10E3/UL (ref 0–0.1)
IMM GRANULOCYTES NFR BLD: 0 %
KETONES P FAST UR STRIP-MCNC: NEGATIVE MG/DL
LYMPHOCYTES # BLD AUTO: 1.6 X10E3/UL (ref 0.7–3.1)
LYMPHOCYTES NFR BLD AUTO: 21 %
MAGNESIUM SERPL-MCNC: 2.5 MG/DL (ref 1.6–2.3)
MCH RBC QN AUTO: 26.6 PG (ref 26.6–33)
MCHC RBC AUTO-ENTMCNC: 32.3 G/DL (ref 31.5–35.7)
MCV RBC AUTO: 82 FL (ref 79–97)
MONOCYTES # BLD AUTO: 1.4 X10E3/UL (ref 0.1–0.9)
MONOCYTES NFR BLD AUTO: 18 %
NEUTROPHILS # BLD AUTO: 4.5 X10E3/UL (ref 1.4–7)
NEUTROPHILS NFR BLD AUTO: 58 %
PH UR STRIP: 7 [PH] (ref 4.6–8)
PLATELET # BLD AUTO: 177 X10E3/UL (ref 150–379)
POTASSIUM SERPL-SCNC: 4.8 MMOL/L (ref 3.5–5.2)
PROT SERPL-MCNC: 6.1 G/DL (ref 6–8.5)
PROT UR QL STRIP: NEGATIVE
RBC # BLD AUTO: 5.46 X10E6/UL (ref 4.14–5.8)
SODIUM SERPL-SCNC: 146 MMOL/L (ref 134–144)
SP GR UR STRIP: 1.03 (ref 1–1.03)
TSH SERPL DL<=0.005 MIU/L-ACNC: 0.71 UIU/ML (ref 0.45–4.5)
UA UROBILINOGEN AMB POC: NORMAL (ref 0.2–1)
URINALYSIS CLARITY POC: CLEAR
URINALYSIS COLOR POC: YELLOW
URINE BLOOD POC: NEGATIVE
URINE LEUKOCYTES POC: NEGATIVE
URINE NITRITES POC: NEGATIVE
VIT B12 SERPL-MCNC: 530 PG/ML (ref 232–1245)
WBC # BLD AUTO: 7.8 X10E3/UL (ref 3.4–10.8)

## 2018-01-01 RX ORDER — CARBIDOPA AND LEVODOPA 25; 100 MG/1; MG/1
1 TABLET ORAL 3 TIMES DAILY
Qty: 90 TAB | Refills: 3 | Status: SHIPPED | OUTPATIENT
Start: 2018-01-01 | End: 2018-01-01 | Stop reason: SDUPTHER

## 2018-01-01 RX ORDER — RIVASTIGMINE 13.3 MG/24H
1 PATCH, EXTENDED RELEASE TRANSDERMAL DAILY
Qty: 30 PATCH | Refills: 3 | Status: SHIPPED | OUTPATIENT
Start: 2018-01-01 | End: 2018-01-01 | Stop reason: SDUPTHER

## 2018-01-01 RX ORDER — RASAGILINE 1 MG/1
TABLET ORAL
Qty: 30 TAB | Refills: 3 | Status: SHIPPED | OUTPATIENT
Start: 2018-01-01

## 2018-01-01 RX ORDER — RASAGILINE 1 MG/1
TABLET ORAL
Qty: 30 TAB | Refills: 3 | Status: SHIPPED | OUTPATIENT
Start: 2018-01-01 | End: 2018-01-01 | Stop reason: SDUPTHER

## 2018-01-01 RX ORDER — RIVASTIGMINE 13.3 MG/24H
PATCH, EXTENDED RELEASE TRANSDERMAL
Qty: 30 PATCH | Refills: 3 | Status: SHIPPED | OUTPATIENT
Start: 2018-01-01

## 2018-01-01 RX ORDER — RIVASTIGMINE 13.3 MG/24H
PATCH, EXTENDED RELEASE TRANSDERMAL
Qty: 30 PATCH | Refills: 3 | Status: SHIPPED | OUTPATIENT
Start: 2018-01-01 | End: 2018-01-01 | Stop reason: SDUPTHER

## 2018-01-01 RX ORDER — CARBIDOPA AND LEVODOPA 25; 100 MG/1; MG/1
1.5 TABLET ORAL 4 TIMES DAILY
Qty: 540 TAB | Refills: 3 | Status: SHIPPED | OUTPATIENT
Start: 2018-01-01

## 2018-01-01 RX ORDER — FUROSEMIDE 20 MG/1
1 TABLET ORAL EVERY OTHER DAY
Refills: 0 | COMMUNITY
Start: 2018-01-01

## 2018-01-01 RX ORDER — CHLORHEXIDINE GLUCONATE 1.2 MG/ML
1 RINSE ORAL
Refills: 0 | COMMUNITY
Start: 2017-01-01 | End: 2018-01-01 | Stop reason: ALTCHOICE

## 2018-01-01 RX ORDER — RASAGILINE 1 MG/1
1 TABLET ORAL DAILY
Qty: 30 TAB | Refills: 3 | Status: SHIPPED | OUTPATIENT
Start: 2018-01-01 | End: 2018-01-01 | Stop reason: SDUPTHER

## 2018-01-01 RX ORDER — AMLODIPINE BESYLATE 2.5 MG/1
2.5 TABLET ORAL DAILY
Qty: 90 TAB | Refills: 4 | Status: SHIPPED | OUTPATIENT
Start: 2018-01-01

## 2018-01-01 RX ORDER — CARBIDOPA AND LEVODOPA 25; 100 MG/1; MG/1
TABLET ORAL
Qty: 90 TAB | Refills: 3 | Status: SHIPPED | OUTPATIENT
Start: 2018-01-01 | End: 2018-01-01 | Stop reason: SDUPTHER

## 2018-01-11 NOTE — MR AVS SNAPSHOT
Visit Information Date & Time Provider Department Dept. Phone Encounter #  
 1/11/2018  1:00 PM Rosalba Capellan, [place] [facility] [id number] Follow-up Instructions Return in about 3 months (around 4/11/2018). Your Appointments 2/20/2018 10:30 AM  
Follow Up with Ligia Barrett NP [address] [facility] ([facility]) Appt Note: [facility] [place] [address] [facility] [address] [facility] [id number] [phone]  
  
   
 [place] [address] [id number] [address] Upcoming Health Maintenance Date Due ZOSTER VACCINE AGE 60> 4/5/2002 GLAUCOMA SCREENING Q2Y 6/5/2007 Pneumococcal 65+ Low/Medium Risk (2 of 2 - PPSV23) 6/10/2017 MEDICARE YEARLY EXAM 6/11/2017 DTaP/Tdap/Td series (2 - Td) 1/11/2028 Allergies as of 1/11/2018  Review Complete On: 1/11/2018 By: Rosalba Capellan MD  
 No Known Allergies Current Immunizations  Never Reviewed Name Date Influenza High Dose Vaccine PF 10/24/2017 Influenza Vaccine 11/12/2013 Pneumococcal Conjugate (PCV-13) 6/10/2016  3:02 PM  
  
 Not reviewed this visit You Were Diagnosed With   
  
 Codes Comments Chronic atrial fibrillation (HCC)    -  Primary ICD-10-CM: J86.8 ICD-9-CM: 427.31 Parkinsonian features     ICD-10-CM: R25.9 ICD-9-CM: 781.0 Influenza and pneumonia     ICD-10-CM: J11.00 ICD-9-CM: 487. 0 Vitals BP Pulse Temp Resp Height(growth percentile) Weight(growth percentile) 130/80 (BP 1 Location: Left arm, BP Patient Position: Sitting) (!) 57 97.8 °F (36.6 °C) (Oral) 20 5' 8\" (1.727 m) 182 lb 9.6 oz (82.8 kg) SpO2 BMI Smoking Status 98% 27.76 kg/m2 Former Smoker BMI and BSA Data Body Mass Index Body Surface Area  
 27.76 kg/m 2 1.99 m 2 Your Updated Medication List  
  
   
This list is accurate as of: 1/11/18  1:56 PM.  Always use your most recent med list. ALTACE 10 mg capsule Generic drug:  ramipril Take 10 mg by mouth daily. amLODIPine 5 mg tablet Commonly known as:  Aixa Pique Take 5 mg by mouth daily. aspirin 325 mg tablet Commonly known as:  ASPIRIN Take 162 mg by mouth daily. carbidopa-levodopa  mg per tablet Commonly known as:  SINEMET Take 1 Tab by mouth three (3) times daily. At 8am, 12noon, 4pm  
  
 chlorthalidone 25 mg tablet Commonly known as:  Derryl Risen Take  by mouth daily. Take 1/2 tab daily  
  
 furosemide 20 mg tablet Commonly known as:  LASIX Take 1 Tab by mouth every other day. LIPITOR 40 mg tablet Generic drug:  atorvastatin Take  by mouth daily. metoprolol tartrate 50 mg tablet Commonly known as:  LOPRESSOR 50 mg two (2) times a day. phosphatidylse-omega-3-dha-epa 100-19.5-6.5 mg Cap Commonly known as:  VAYACOG Take 1 Cap by mouth daily. rasagiline 1 mg tablet Commonly known as:  AZILECT Take 1 Tab by mouth daily. rivastigmine 13.3 mg/24 hour patch Commonly known as:  EXELON  
1 Patch by TransDERmal route daily. Follow-up Instructions Return in about 3 months (around 4/11/2018). To-Do List   
 Around 01/25/2018 Imaging:  XR CHEST PA LAT Patient Instructions If you have any questions regarding [employer], you may call [employer] support at [phone]. Introducing [facility] & HEALTH SERVICES! [employer] introduces Cimetrix patient portal. Now you can access parts of your medical record, email your doctor's office, and request medication refills online. 1. In your internet browser, go to [email or web address] 2. Click on the First Time User? Click Here link in the Sign In box. You will see the New Member Sign Up page. 3. Enter your Cimetrix Access Code exactly as it appears below. You will not need to use this code after youve completed the sign-up process.  If you do not sign up before the expiration date, you must request a new code. · [employer] Access Code: [email or web address] Expires: 4/11/2018  1:48 PM 
 
4. Enter the last four digits of your Social Security Number (xxxx) and Date of Birth (mm/dd/yyyy) as indicated and click Submit. You will be taken to the next sign-up page. 5. Create a [employer] ID. This will be your [employer] login ID and cannot be changed, so think of one that is secure and easy to remember. 6. Create a [employer] password. You can change your password at any time. 7. Enter your Password Reset Question and Answer. This can be used at a later time if you forget your password. 8. Enter your e-mail address. You will receive e-mail notification when new information is available in [address] 9. Click Sign Up. You can now view and download portions of your medical record. 10. Click the Download Summary menu link to download a portable copy of your medical information. If you have questions, please visit the Frequently Asked Questions section of the [employer] website. Remember, [employer] is NOT to be used for urgent needs. For medical emergencies, dial 911. Now available from your iPhone and Android! Please provide this summary of care documentation to your next provider. Your primary care clinician is listed as Sunday John. If you have any questions after today's visit, please call [phone].

## 2018-01-11 NOTE — PROGRESS NOTES
Chief Complaint   Patient presents with    Irregular Heart Beat         HPI:       is a 78 y.o. male retired  and cousin of Lupe. Worsening Parkinson's disease. [facility], AF and pneumonia. Dr Gislel Vidal increased furosemide and he has diuresed nicely and feels better. Due for electrolyte assessment. No Known Allergies    Current Outpatient Prescriptions   Medication Sig    furosemide (LASIX) 20 mg tablet Take 1 Tab by mouth every other day.  rasagiline (AZILECT) 1 mg tablet Take 1 Tab by mouth daily.  carbidopa-levodopa (SINEMET)  mg per tablet Take 1 Tab by mouth three (3) times daily. At 8am, 12noon, 4pm    rivastigmine (EXELON) 13.3 mg/24 hour patch 1 Patch by TransDERmal route daily.  phosphatidylse-omega-3-dha-epa (VAYACOG) 100-19.5-6.5 mg cap Take 1 Cap by mouth daily.  aspirin (ASPIRIN) 325 mg tablet Take 162 mg by mouth daily.  amLODIPine (NORVASC) 5 mg tablet Take 5 mg by mouth daily.  metoprolol (LOPRESSOR) 50 mg tablet 50 mg two (2) times a day.  chlorthalidone (HYGROTEN) 25 mg tablet Take  by mouth daily. Take 1/2 tab daily    atorvastatin (LIPITOR) 40 mg tablet Take  by mouth daily.  ramipril (ALTACE) 10 mg capsule Take 10 mg by mouth daily. No current facility-administered medications for this visit. Past Medical History:   Diagnosis Date    ASCVD (arteriosclerotic cardiovascular disease)     Atrial fibrillation with [facility]) May 31, 2016    [place]:  Dr Vj Willson BPH (benign prostatic hypertrophy)     Fatigue     daytime    Hyperlipidemia     Hypertension     Pulmonary hypertension          ROS:  Denies fever, chills, cough, chest pain, SOB,  nausea, vomiting, or diarrhea. Denies wt loss, wt gain, hemoptysis, hematochezia or melena.     Physical Examination:    /80 (BP 1 Location: Left arm, BP Patient Position: Sitting)  Pulse (!) 57  Temp 97.8 °F (36.6 °C) (Oral) Resp 20  Ht 5' 8\" (1.727 m)  Wt 182 lb 9.6 oz (82.8 kg)  SpO2 98%  BMI 27.76 kg/m2    General: Alert and Ox2. Bradykinetic. HEENT:  NC/AT, EOMI, OP: clear  Neck:  Supple, no adenopathy, JVD, mass or bruit  Chest:  Clear to Ausculation, without wheezes, rales, rubs or ronchi  Cardiac: RRR  Abdomen:  +BS, soft, nontender without palpable HSM  Extremities:  No cyanosis, clubbing or edema  Neurologic:  Shuffling gate, masked facies, diminished arm swing, CN 2-12 grossly intact. Moves all extremities. Skin: no rash  Lymphadenopathy: no cervical or supraclavicular nodes      ASSESSMENT AND PLAN:     1. Recent pneumonia:  Repeat CXR  2. PD: stable  3.  AF and CHF:  Recent diuresis has helped. Watching K+. 4.  RTC in 4 months. Orders Placed This Encounter    XR CHEST PA LAT     Please send results to [facility], Fax [phone] (F)     Standing Status:   Future     Standing Expiration Date:   2/11/2019     Order Specific Question:   Reason for Exam     Answer:   pneumonia in December 2017:  has Chest XR returned to normal?     Order Specific Question:   Is Patient Allergic to Contrast Dye? Answer:   Unknown     Order Specific Question:   Which facility to perform procedure? Answer:   [facility]: chlorhexidine (PERIDEX) 0.12 % solution     Sig: Take 1 mL by mouth daily as needed. Refill:  0    furosemide (LASIX) 20 mg tablet     Sig: Take 1 Tab by mouth every other day.      Refill:  0       Lonza MD David, [address]

## 2018-02-20 NOTE — PROGRESS NOTES
Parkinson's disease- about the same as he was the last visit   Doesn't think he is any worse    Memory- takes him longer in the morning to get oriented to things some mornings are better than others   This morning he was spot on, yesterday morning it took her several times to get him to remember things     They have DMV forms that need to be filled out

## 2018-02-20 NOTE — MR AVS SNAPSHOT
[address] [facility] [address] [facility] [address] [phone] Patient: Gaby Sandoval MRN: [id number] [id number] Visit Information Date & Time Provider Department Dept. Phone Encounter #  
 2/20/2018 10:30 AM Pavel Obrien NP [facility] Neurology [facility] [phone] [id number] Your Appointments 5/17/2018 10:00 AM  
ESTABLISHED PATIENT with Catherine Hicks MD  
[facility] ([facility]) Appt Note: 4 mo f/u  
 1000 [facility] [address] [facility],5Th Floor [id number] [phone]  
  
   
 [address] [address] Upcoming Health Maintenance Date Due ZOSTER VACCINE AGE 60> 4/5/2002 GLAUCOMA SCREENING Q2Y 6/5/2007 Pneumococcal 65+ Low/Medium Risk (2 of 2 - PPSV23) 6/10/2017 MEDICARE YEARLY EXAM 6/11/2017 DTaP/Tdap/Td series (2 - Td) 1/11/2028 Allergies as of 2/20/2018  Review Complete On: 2/20/2018 By: Pavel Obrien NP No Known Allergies Current Immunizations  Never Reviewed Name Date Influenza High Dose Vaccine PF 10/24/2017 Influenza Vaccine 11/12/2013 Pneumococcal Conjugate (PCV-13) 6/10/2016  3:02 PM  
  
 Not reviewed this visit You Were Diagnosed With   
  
 Codes Comments Parkinson's disease ([facility]    -  Primary ICD-10-CM: G20 
ICD-9-CM: 332.0 Dementia due to Parkinson's disease without behavioral disturbance ([facility]     ICD-10-CM: G20, F02.80 ICD-9-CM: 332.0, 294.10 Vitals BP Pulse Resp Height(growth percentile) Weight(growth percentile) SpO2  
 134/68 (!) 50 20 5' 8\" (1.727 m) 182 lb 14.4 oz (83 kg) 97% BMI Smoking Status 27.81 kg/m2 Former Smoker Vitals History BMI and BSA Data Body Mass Index Body Surface Area  
 27.81 kg/m 2 2 m 2 Preferred Pharmacy Pharmacy Name Phone [address] [facility] [phone] Your Updated Medication List  
  
   
 This list is accurate as of: 18 11:46 AM.  Always use your most recent med list. ALTACE 10 mg capsule Generic drug:  ramipril Take 10 mg by mouth daily. amLODIPine 5 mg tablet Commonly known as:  Senora Renuka Take 5 mg by mouth daily. aspirin 325 mg tablet Commonly known as:  ASPIRIN Take 162 mg by mouth daily. carbidopa-levodopa  mg per tablet Commonly known as:  SINEMET Take 1 Tab by mouth three (3) times daily. At 8am, 12noon, 4pm  
  
 chlorthalidone 25 mg tablet Commonly known as:  Gianfranco Hoose Take  by mouth daily. Take 1/2 tab daily  
  
 furosemide 20 mg tablet Commonly known as:  LASIX Take 1 Tab by mouth every other day. LIPITOR 40 mg tablet Generic drug:  atorvastatin Take  by mouth daily. metoprolol tartrate 50 mg tablet Commonly known as:  LOPRESSOR 50 mg two (2) times a day. rasagiline 1 mg tablet Commonly known as:  AZILECT Take 1 Tab by mouth daily. rivastigmine 13.3 mg/24 hour patch Commonly known as:  EXELON  
1 Patch by TransDERmal route daily. Prescriptions Sent to Pharmacy Refills  
 rasagiline (AZILECT) 1 mg tablet 3 Sig: Take 1 Tab by mouth daily. Class: Normal  
 Pharmacy: [address] [place] [address] Ph #: [phone] Route: Oral  
 carbidopa-levodopa (SINEMET)  mg per tablet 3 Sig: Take 1 Tab by mouth three (3) times daily. At 8am, 12noon, 4pm  
 Class: Normal  
 Pharmacy: [address] [place] [address] Ph #: [phone] Route: Oral  
 rivastigmine (EXELON) 13.3 mg/24 hour patch 3 Si Patch by TransDERmal route daily. Class: Normal  
 Pharmacy: [address] [place] [address] Ph #: [phone] Route: TransDERmal  
  
Patient Instructions A Healthy Lifestyle: Care Instructions Your Care Instructions A healthy lifestyle can help you feel good, stay at a healthy weight, and have plenty of energy for both work and play. A healthy lifestyle is something you can share with your whole family. A healthy lifestyle also can lower your risk for serious health problems, such as high blood pressure, heart disease, and diabetes. You can follow a few steps listed below to improve your health and the health of your family. Follow-up care is a key part of your treatment and safety. Be sure to make and go to all appointments, and call your doctor if you are having problems. It's also a good idea to know your test results and keep a list of the medicines you take. How can you care for yourself at home? · Do not eat too much sugar, fat, or fast foods. You can still have dessert and treats now and then. The goal is moderation. · Start small to improve your eating habits. Pay attention to portion sizes, drink less juice and soda pop, and eat more fruits and vegetables. ¨ Eat a healthy amount of food. A 3-ounce serving of meat, for example, is about the size of a deck of cards. Fill the rest of your plate with vegetables and whole grains. ¨ Limit the amount of soda and sports drinks you have every day. Drink more water when you are thirsty. ¨ Eat at least 5 servings of fruits and vegetables every day. It may seem like a lot, but it is not hard to reach this goal. A serving or helping is 1 piece of fruit, 1 cup of vegetables, or 2 cups of leafy, raw vegetables. Have an apple or some carrot sticks as an afternoon snack instead of a candy bar. Try to have fruits and/or vegetables at every meal. 
· Make exercise part of your daily routine. You may want to start with simple activities, such as walking, bicycling, or slow swimming. Try to be active 30 to 60 minutes every day. You do not need to do all 30 to 60 minutes all at once. For example, you can exercise 3 times a day for 10 or 20 minutes.  Moderate exercise is safe for most people, but it is always a good idea to talk to your doctor before starting an exercise program. 
· Keep moving. Wadie Daft the lawn, work in the garden, or [employer]. Take the stairs instead of the elevator at work. · If you smoke, quit. People who smoke have an increased risk for heart attack, stroke, cancer, and other lung illnesses. Quitting is hard, but there are ways to boost your chance of quitting tobacco for good. ¨ Use nicotine gum, patches, or lozenges. ¨ Ask your doctor about stop-smoking programs and medicines. ¨ Keep trying. In addition to reducing your risk of diseases in the future, you will notice some benefits soon after you stop using tobacco. If you have shortness of breath or asthma symptoms, they will likely get better within a few weeks after you quit. · Limit how much alcohol you drink. Moderate amounts of alcohol (up to 2 drinks a day for men, 1 drink a day for women) are okay. But drinking too much can lead to liver problems, high blood pressure, and other health problems. Family health If you have a family, there are many things you can do together to improve your health. · Eat meals together as a family as often as possible. · Eat healthy foods. This includes fruits, vegetables, lean meats and dairy, and whole grains. · Include your family in your fitness plan. Most people think of activities such as jogging or tennis as the way to fitness, but there are many ways you and your family can be more active. Anything that makes you breathe hard and gets your heart pumping is exercise. Here are some tips: 
¨ Walk to do errands or to take your child to school or the bus. ¨ Go for a family bike ride after dinner instead of watching TV. Where can you learn more? Go to http://cassie-kevin.info/. Enter N244 in the search box to learn more about \"A Healthy Lifestyle: Care Instructions. \" Current as of: May 12, 2017 Content Version: 11.4 © 9459-4836 Healthwise, Incorporated. Care instructions adapted under license by [employer] (which disclaims liability or warranty for this information). If you have questions about a medical condition or this instruction, always ask your healthcare professional. [address] any warranty or liability for your use of this information. Introducing \[facility] [place] [facility]\"" & HEALTH SERVICES! Suzette Barfield introduces thredUP patient portal. Now you can access parts of your medical record, email your doctor's office, and request medication refills online. 1. In your internet browser, go to [email or web address] 2. Click on the First Time User? Click Here link in the Sign In box. You will see the New Member Sign Up page. 3. Enter your thredUP Access Code exactly as it appears below. You will not need to use this code after youve completed the sign-up process. If you do not sign up before the expiration date, you must request a new code. · thredUP Access Code: [email or web address] Expires: 4/11/2018  1:48 PM 
 
4. Enter the last four digits of your Social Security Number (xxxx) and Date of Birth (mm/dd/yyyy) as indicated and click Submit. You will be taken to the next sign-up page. 5. Create a thredUP ID. This will be your thredUP login ID and cannot be changed, so think of one that is secure and easy to remember. 6. Create a thredUP password. You can change your password at any time. 7. Enter your Password Reset Question and Answer. This can be used at a later time if you forget your password. 8. Enter your e-mail address. You will receive e-mail notification when new information is available in [address] 9. Click Sign Up. You can now view and download portions of your medical record. 10. Click the Download Summary menu link to download a portable copy of your medical information.  
 
If you have questions, please visit the Frequently Asked Questions section of the [employer]. Remember, [employer] is NOT to be used for urgent needs. For medical emergencies, dial 911. Now available from your iPhone and Android! Please provide this summary of care documentation to your next provider. Your primary care clinician is listed as Luana Quintanilla. If you have any questions after today's visit, please call [phone].

## 2018-02-20 NOTE — PROGRESS NOTES
Date:  18     Name:  Leelee Sánchez  :  1940  MRN:  [id number]     PCP:  Rosalino Gamboa MD    Chief Complaint   Patient presents with    Parkinsons Disease     HISTORY OF PRESENT ILLNESS: Follow up evaluation of parkinson's disease and related dementia. He continues to take Sinemet three times a day along with Azilect for the PD. He continues to take Exelon Patch 13.3mg daily for the dementia. He is on monotherapy due to intolerance to Namenda. Wife indicates that sometimes in the morning or when the routine is different, he might have a bit more confusion or disorientation. He has good days and bad days. He has had some episodes when he might talk in his sleep or act out dreams, but it is not bothersome. No hallucinations or delusions. He has some situational depression because he cannot do what he wants or even when he wants to do them. He is slow in his movement but he does not think this is any worse. Wife thinks he might be a bit stiffer and notes that he seems to not swing his arms. Since his last office visit, he has been in the hospital due to A-fib and subsequent CHF and then pneumonia. Except as noted above, denies  fever, chills, cough. No CP or SOB. No dysuria, loss of bowel or bladder control. No Weight loss. Appetite good. Sleeping well. No sweats. No edema. No bruising or bleeding. No nausea or vomit. No diarrhea. No frequency, urgency, No depressive sxs. No anxiety. Denies sore throat, nasal congestion, nasal discharge, epistaxis, tinnitus, hearing loss, back pain, muscle pain, or joint pain. Current Outpatient Prescriptions   Medication Sig    furosemide (LASIX) 20 mg tablet Take 1 Tab by mouth every other day.  rasagiline (AZILECT) 1 mg tablet Take 1 Tab by mouth daily.  carbidopa-levodopa (SINEMET)  mg per tablet Take 1 Tab by mouth three (3) times daily.  At 8am, 12noon, 4pm    rivastigmine (EXELON) 13.3 mg/24 hour patch 1 Patch by TransDERmal route daily.  aspirin (ASPIRIN) 325 mg tablet Take 162 mg by mouth daily.  amLODIPine (NORVASC) 5 mg tablet Take 5 mg by mouth daily.  metoprolol (LOPRESSOR) 50 mg tablet 50 mg two (2) times a day.  chlorthalidone (HYGROTEN) 25 mg tablet Take  by mouth daily. Take 1/2 tab daily    atorvastatin (LIPITOR) 40 mg tablet Take  by mouth daily.  ramipril (ALTACE) 10 mg capsule Take 10 mg by mouth daily. No current facility-administered medications for this visit. No Known Allergies  Past Medical History:   Diagnosis Date    ASCVD (arteriosclerotic cardiovascular disease)     Atrial fibrillation with [facility]) May 31, 2016    [facility]:  Dr Seven Salas BPH (benign prostatic hypertrophy)     Fatigue     daytime    Hyperlipidemia     Hypertension     Pulmonary hypertension      Past Surgical History:   Procedure Laterality Date    HX TONSILLECTOMY       Social History     Social History    Marital status:      Spouse name: N/A    Number of children: N/A    Years of education: N/A     Occupational History    Not on file.      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    Blood Transfusions No    Caffeine Concern No    Occupational Exposure No    Hobby Hazards No    Sleep Concern No    Stress Concern No    Weight Concern No    Special Diet No    Back Care No    Exercise Yes     Fileblaze Program for Parkinsons 3x/wk    Bike Helmet No    Seat Belt Yes    Self-Exams No     Social History Narrative     Family History   Problem Relation Age of Onset    Cancer Mother      lymphoma    Heart Disease Father      MI       PHYSICAL EXAMINATION:    Visit Vitals    /68    Pulse (!) 50    Resp 20    Ht 5' 8\" (1.727 m)    Wt 83 kg (182 lb 14.4 oz)    SpO2 97%    BMI 27.81 kg/m2     General:  Well defined, nourished, and groomed individual in no acute distress.     Neck:   Supple, nontender, no bruits, no pain with resistance to active range of motion.     Heart:  Regular rate and rhythm, no murmurs, rub, or gallop.  Normal S1S2. Lungs:  Clear to auscultation bilaterally with equal chest expansion, no cough, no wheeze  Musculoskeletal:  Extremities revealed no edema and had full range of motion of joints.     Psych:  Flat affect and mood. Mask like face.       NEUROLOGICAL EXAMINATION:      Mental Status:   Alert and oriented to person, place and time. MMSE today was 24/30. He was unable to copy the intersecting pentagons and had an abnormal clock draw. 2/3 items after five minutes of distraction. Not oriented to the specific date. He was also not oriented to the county though this may be due to being unfamiliar with the area more than anything else in which case one might be able to argue that the MMSE is actually 25/30. Unable to spell WORLD backwards.       Cranial Nerves:     II, III, IV, VI:  Visual acuity grossly intact.  Visual fields are normal.     Pupils are equal, round, and reactive to light and accommodation.     Extra-ocular movements are full and fluid.  Fundoscopic exam was benign, no ptosis or nystagmus.    V-XII:   Hearing is grossly intact.  Facial features are symmetric, with normal sensation and strength.  The palate rises symmetrically and the tongue protrudes midline.  Sternocleidomastoids 5/5.        Motor Examination:     Normal tone, bulk, and strength, 5/5 muscle strength throughout.  No noticeable cogwheel rgidity today      Coordination:  Finger to nose and rapid arm movement testing demonstrated bradykinesia and loss of fine motor control without rest or intention tremor      Gait and Station:  small steps but not as shuffled and absent bilateral arm swing.  No pronator drift.   No muscle wasting or fasiculations noted.        Reflexes:  DTRs 2+ throughout.        ASSESSMENT AND PLAN    ICD-10-CM ICD-9-CM    1. Parkinson's disease ([facility] G20 332.0 rasagiline (AZILECT) 1 mg tablet      carbidopa-levodopa (SINEMET)  mg per tablet   2. Dementia due to Parkinson's disease without behavioral disturbance ([address] G20 332.0 rivastigmine (EXELON) 13.3 mg/24 hour patch    F02.80 294.10      Parkinson's disease is stable on present therapy of Sinemet and Azilect and he will continue with this as previously prescribed. The dementia has progressed slightly in the last two years based on MMSE. In February 2016, MMSE was 27/30. Today it was 24/30, though some argument could be made that his score may be 25/30 due to not knowing the county which could be related to not knowing the area. He will continue with Exelon Patch 13.3mg. Unfortunately, he was not able to tolerate the Namenda so has to be maintained on monotherapy. As his cognitive issues really have had minimal change and he seems to still be safe based on his wife's report, I see no issue with him being able to drive the short distances she allows which for the most part sounds like is within a 1 mile radius of home. Follow up in four months    [address] [facility].  Chastity Dumont

## 2018-02-20 NOTE — PATIENT INSTRUCTIONS

## 2018-05-01 NOTE — PROGRESS NOTES
Pt in yesterday to check urine results not entered. Urine test was normal. Copy scanned into chart for review.

## 2018-05-01 NOTE — PROGRESS NOTES
Results for orders placed or performed in visit on 05/01/18   AMB POC URINALYSIS DIP STICK MANUAL W/O MICRO   Result Value Ref Range    Color (UA POC) Yellow     Clarity (UA POC) Clear     Glucose (UA POC) Negative Negative    Bilirubin (UA POC) Negative Negative    Ketones (UA POC) Negative Negative    Specific gravity (UA POC) 1.030 1.001 - 1.035    Blood (UA POC) Negative Negative    pH (UA POC) 7.0 4.6 - 8.0    Protein (UA POC) Negative Negative    Urobilinogen (UA POC) normal 0.2 - 1    Nitrites (UA POC) Negative Negative    Leukocyte esterase (UA POC) Negative Negative

## 2018-05-01 NOTE — ACP (ADVANCE CARE PLANNING)
Pt has advance directive at home. Recommended to bring by the clinic for inclusion in chart at patient's convenience.  Discussed 5/1/2018

## 2018-05-01 NOTE — TELEPHONE ENCOUNTER
Pt was seen yesterday by [facility] in [facility] for a urine sample concerning a UTI. [facility] told the patient if he isn't feeling well today to call [place] to see if William Grijalva can see him today. Pt is still dizzy. Call his wife Sheri at [phone].

## 2018-05-01 NOTE — PROGRESS NOTES
Chief Complaint   Patient presents with    Dizziness         HPI:       is a 78 y.o. male with advanced PD. Here with his wife, Lauree Moritz, who reports that he has not been himself--weaker and without his usual stamina and much slower. May have missed a few doses of sinemet within the past 2 weeks. She has noted that early in the morning he seems to be worse--more confused and slower that normal.       Cardiac history:  RCA stent Sept 2016. Amlodipine initiated due to HTN. Later, admitted to [facility] with CHF and Lasix begun. Recently has had his Amlodipine discontinued by Dr Makenzie Liao due to low blood pressure. New Issues:  Due for SAWV    No Known Allergies    Current Outpatient Prescriptions   Medication Sig    carbidopa-levodopa (SINEMET)  mg per tablet take 1 tablet by mouth three times a day AT 8AM,12 NOON AND 4PM    rivastigmine (EXELON) 13.3 mg/24 hour patch apply 1 patch BY TRANSDERMAL ROUTE DAILY    rasagiline (AZILECT) 1 mg tablet Take 1 Tab by mouth daily.  aspirin (ASPIRIN) 325 mg tablet Take 162 mg by mouth daily.  metoprolol (LOPRESSOR) 50 mg tablet 50 mg two (2) times a day.  ramipril (ALTACE) 10 mg capsule Take 10 mg by mouth daily.  furosemide (LASIX) 20 mg tablet Take 1 Tab by mouth every other day.  atorvastatin (LIPITOR) 40 mg tablet Take  by mouth daily. No current facility-administered medications for this visit. Past Medical History:   Diagnosis Date    ASCVD (arteriosclerotic cardiovascular disease)     Atrial fibrillation with RVR [facility]) May 31, 2016    [place] [facility]:  Dr Kamla Venegas BPH (benign prostatic hypertrophy)     Fatigue     daytime    Hyperlipidemia     Hypertension     Pulmonary hypertension (HCC)          ROS:  Denies fever, chills, cough, chest pain, SOB,  nausea, vomiting, or diarrhea. Denies wt loss, wt gain, hemoptysis, hematochezia or melena.     Physical Examination:    /88 (BP 1 Location: Left arm, BP Patient Position: Sitting)  Pulse 96  Temp 98.2 °F (36.8 °C) (Oral)   Resp 16  Ht 5' 8\" (1.727 m)  Wt 181 lb (82.1 kg)  SpO2 96%  BMI 27.52 kg/m2    General: Alert and Ox2, speech is slow and halting. HEENT:  NC/AT, EOMI, OP: clear  Neck:  Supple, no adenopathy, JVD, mass or bruit  Chest:  Clear to Ausculation, without wheezes, rales, rubs or ronchi  Cardiac: RRR  Abdomen:  +BS, soft, nontender without palpable HSM  Extremities:  No cyanosis, clubbing or edema  Neurologic:  Ambulatory without assist, CN 2-12 grossly intact. Moves all extremities. Markedly bradykinetic. En bloc turning. Skin: no rash  Lymphadenopathy: no cervical or supraclavicular nodes      ASSESSMENT AND PLAN:     1.  AMS:   Etiology is not clear but may be related to missing a few doses of Sinemet, mild dehydration or even HTN. Labs are pending but I have asked that he resume his Amlodipine at 2.5 mg daily. followup already booked for 2 weeks. Reviewing Neuro notes documents that he has had early morning sx previously. UA in the office today is negative for UTI  2. HTN:  Amlodipine 2.5 mg daily  3. PD:  Importance of timing and compliance reviewed with Lauree Moritz Orders Placed This Encounter    CBC WITH AUTOMATED DIFF    METABOLIC PANEL, COMPREHENSIVE    MAGNESIUM    TSH 3RD GENERATION    VITAMIN B12    AMB POC URINALYSIS DIP STICK MANUAL W/O MICRO       Chelsey Segovia MD, FACP        ______________________________________________________________________    Trendhaylie Aldana is a 78 y.o. male and presents for annual Medicare Wellness Visit. Problem List: Reviewed with patient and discussed risk factors.     Patient Active Problem List   Diagnosis Code    Vertigo R42    HTN (hypertension) I10    ASCVD (arteriosclerotic cardiovascular disease) I25.10    A-fib (HCC) I48.91    Memory changes R41.3    Parkinsonian features R25.9    Atrial fibrillation with RVR ([address] I48.91       Current medical providers:  Patient Care Team:  Abby Wilder MD as PCP - General (Internal Medicine)    [facility], [facility], Medications/Allergies: reviewed, on chart. Male Alcohol Screening: On any occasion during the past 3 months, have you had more than 4 drinks containing alcohol? No    Do you average more than 14 drinks per week? No    ROS:  Constitutional: No fever, chills or weight loss  Respiratory: No cough, SOB   CV: No chest pain or Palpitations    Objective:  Visit Vitals    /88 (BP 1 Location: Left arm, BP Patient Position: Sitting)    Pulse 96    Temp 98.2 °F (36.8 °C) (Oral)    Resp 16    Ht 5' 8\" (1.727 m)    Wt 181 lb (82.1 kg)    SpO2 96%    BMI 27.52 kg/m2    Body mass index is 27.52 kg/(m^2). Assessment of cognitive impairment: Alert and oriented x 2    Depression Screen:   PHQ over the last two weeks 1/11/2018   Little interest or pleasure in doing things Not at all   Feeling down, depressed or hopeless Not at all   Total Score PHQ 2 0       Fall Risk Assessment:    Fall Risk Assessment, last 12 mths 1/11/2018   Able to walk? Yes   Fall in past 12 months? No       Functional Ability:   Does the patient exhibit a steady gait? no   How long did it take the patient to get up and walk from a sitting position? 12 sec   Is the patient self reliant?  (ie can do own laundry, meals, household chores)  no     Does the patient handle his/her own medications?  no     Does the patient handle his/her own money? no     Is the patients home safe (ie good lighting, handrails on stairs and bath, etc.)? yes     Did you notice or did patient express any hearing difficulties? yes     Did you notice or did patient express any vision difficulties?    no       Advance Care Planning:   Patient was offered the opportunity to discuss advance care planning:  yes     Does patient have an Advance Directive:  yes   If no, did you provide information on Caring Connections?  no       Plan:      Orders Placed This Encounter    CBC WITH AUTOMATED DIFF    METABOLIC PANEL, COMPREHENSIVE    MAGNESIUM    TSH 3RD GENERATION    VITAMIN B12    AMB POC URINALYSIS DIP STICK MANUAL W/O MICRO       Health Maintenance   Topic Date Due    ZOSTER VACCINE AGE 60>  04/05/2002    Pneumococcal 65+ Low/Medium Risk (2 of 2 - PPSV23) 06/10/2017    MEDICARE YEARLY EXAM  03/14/2018    Influenza Age 5 to Adult  08/01/2018    GLAUCOMA SCREENING Q2Y  06/22/2019    DTaP/Tdap/Td series (2 - Td) 01/11/2028       *Patient verbalized understanding and agreement with the plan. A copy of the After Visit Summary with personalized health plan was given to the patient today.

## 2018-05-01 NOTE — MR AVS SNAPSHOT
[address] [place] [address] [address] 
[phone] Patient: Russell France MRN: [id number] QFA:[birth year] Visit Information Date & Time Provider Department Dept. Phone Encounter #  
 5/1/2018  3:00 PM Mercedes GarciaPriti 72 [phone] [id number] Your Appointments 5/17/2018 10:00 AM  
ESTABLISHED PATIENT with Mercedes Garcia MD  
[facility] ([facility]) Appt Note: 4 mo f/u  
 1000 [facility] [address] [facility],5[address] [address] [phone]  
  
   
 [address] [address] 6/19/2018 10:30 AM  
Follow Up with Slime Barber NP 1991 [facility] ([facility]) Appt Note: 4mo f/up parkinson's/ memory cr  
 [facility] [address] [facility] 99 [address] [phone]  
  
   
 [place] [address] [facility] [address] [id number] [address] Upcoming Health Maintenance Date Due ZOSTER VACCINE AGE 60> 4/5/2002 Pneumococcal 65+ Low/Medium Risk (2 of 2 - PPSV23) 6/10/2017 MEDICARE YEARLY EXAM 3/14/2018 Influenza Age 5 to Adult 8/1/2018 GLAUCOMA SCREENING Q2Y 6/22/2019 DTaP/Tdap/Td series (2 - Td) 1/11/2028 Allergies as of 5/1/2018  Review Complete On: 5/1/2018 By: Mercedes Garcia MD  
 No Known Allergies Current Immunizations  Never Reviewed Name Date Influenza High Dose Vaccine PF 10/24/2017 Influenza Vaccine 11/12/2013 Pneumococcal Conjugate (PCV-13) 6/10/2016  3:02 PM  
  
 Not reviewed this visit You Were Diagnosed With   
  
 Codes Comments Memory changes    -  Primary ICD-10-CM: R41.3 ICD-9-CM: 780.93 Vertigo     ICD-10-CM: F86 ICD-9-CM: 780.4 Urine frequency     ICD-10-CM: R35.0 ICD-9-CM: 788.41 Parkinsonian features     ICD-10-CM: R25.9 ICD-9-CM: 781.0 Essential hypertension     ICD-10-CM: I10 
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  
 27.52 kg/m 2 1.98 m 2 Preferred Pharmacy Pharmacy Name Phone [facility] [phone] [place] [address] [phone] Your Updated Medication List  
  
   
This list is accurate as of 5/1/18  3:50 PM.  Always use your most recent med list. ALTACE 10 mg capsule Generic drug:  ramipril Take 10 mg by mouth daily. aspirin 325 mg tablet Commonly known as:  ASPIRIN Take 162 mg by mouth daily. carbidopa-levodopa  mg per tablet Commonly known as:  SINEMET  
take 1 tablet by mouth three times a day AT 8AM,12 NOON AND 4PM  
  
 furosemide 20 mg tablet Commonly known as:  LASIX Take 1 Tab by mouth every other day. LIPITOR 40 mg tablet Generic drug:  atorvastatin Take  by mouth daily. metoprolol tartrate 50 mg tablet Commonly known as:  LOPRESSOR 50 mg two (2) times a day. rasagiline 1 mg tablet Commonly known as:  AZILECT Take 1 Tab by mouth daily. rivastigmine 13.3 mg/24 hour patch Commonly known as:  EXELON  
apply 1 patch BY TRANSDERMAL ROUTE DAILY We Performed the Following AMB POC URINALYSIS DIP STICK MANUAL W/O MICRO [36105 CPT(R)] CBC WITH AUTOMATED DIFF [31583 CPT(R)] MAGNESIUM Y204621 CPT(R)] METABOLIC PANEL, COMPREHENSIVE [81024 CPT(R)] TSH 3RD GENERATION [48526 CPT(R)] VITAMIN B12 [id number] CPT(R)] Introducing [facility]!    
 [employer] introduces [employer] patient portal. Now you can access parts of your medical record, email your doctor's office, and request medication refills online. 1. In your internet browser, go to [email or web address] 2. Click on the First Time User? Click Here link in the Sign In box. You will see the New Member Sign Up page. 3. Enter your [employer] Access Code exactly as it appears below. You will not need to use this code after youve completed the sign-up process. If you do not sign up before the expiration date, you must request a new code. · [employer] Access Code: [email or web address] Expires: 7/30/2018  3:50 PM 
 
4. Enter the last four digits of your Social Security Number (xxxx) and Date of Birth (mm/dd/yyyy) as indicated and click Submit. You will be taken to the next sign-up page. 5. Create a [employer] ID. This will be your [employer] login ID and cannot be changed, so think of one that is secure and easy to remember. 6. Create a [employer] password. You can change your password at any time. 7. Enter your Password Reset Question and Answer. This can be used at a later time if you forget your password. 8. Enter your e-mail address. You will receive e-mail notification when new information is available in [address] 9. Click Sign Up. You can now view and download portions of your medical record. 10. Click the Download Summary menu link to download a portable copy of your medical information. If you have questions, please visit the Frequently Asked Questions section of the [employer] website. Remember, [employer] is NOT to be used for urgent needs. For medical emergencies, dial 911. Now available from your iPhone and Android! Please provide this summary of care documentation to your next provider. Your primary care clinician is listed as Anthony Steinberg. If you have any questions after today's visit, please call [phone].

## 2018-06-08 NOTE — PROGRESS NOTES
1. Have you been to the ER, urgent care clinic since your last visit? Hospitalized since your last visit? No    2. Have you seen or consulted any other health care providers outside of the [address] [place] [address] since your last visit? Include any pap smears or colon screening.  No

## 2018-06-08 NOTE — PROGRESS NOTES
Chief Complaint   Patient presents with    Hypertension         HPI:       is a 70 y.o. male with advanced PD. Here with his wife, Geni De Los Santos, who reports that he has not been himself--weaker and without his usual stamina and much slower. May have missed a few doses of sinemet within the past 2 weeks. She has noted that early in the morning he seems to be worse--more confused and slower that normal.       Cardiac history:  RCA stent Sept 2016. Amlodipine initiated due to HTN. Later, admitted to [facility] with CHF and Lasix begun. Tolerating the Amlodipine at 2.5 mg daily      No Known Allergies    Current Outpatient Prescriptions   Medication Sig    rasagiline (AZILECT) 1 mg tablet take 1 tablet by mouth once daily    amLODIPine (NORVASC) 2.5 mg tablet Take 1 Tab by mouth daily.  carbidopa-levodopa (SINEMET)  mg per tablet take 1 tablet by mouth three times a day AT 8AM,12 NOON AND 4PM    rivastigmine (EXELON) 13.3 mg/24 hour patch apply 1 patch BY TRANSDERMAL ROUTE DAILY    furosemide (LASIX) 20 mg tablet Take 1 Tab by mouth every other day.  aspirin (ASPIRIN) 325 mg tablet Take 162 mg by mouth daily.  metoprolol (LOPRESSOR) 50 mg tablet 50 mg two (2) times a day.  atorvastatin (LIPITOR) 40 mg tablet Take  by mouth daily.  ramipril (ALTACE) 10 mg capsule Take 10 mg by mouth daily. No current facility-administered medications for this visit. Past Medical History:   Diagnosis Date    ASCVD (arteriosclerotic cardiovascular disease)     Atrial fibrillation with R[facility]) May 31, 2016    [place] [facility]:  Dr Daniel Davis BPH (benign prostatic hypertrophy)     Fatigue     daytime    Hyperlipidemia     Hypertension     Pulmonary hypertension (HCC)          ROS:  Denies fever, chills, cough, chest pain, SOB,  nausea, vomiting, or diarrhea. Denies wt loss, wt gain, hemoptysis, hematochezia or melena.     Physical Examination:    BP 94/44 (BP 1 Location: Left arm, BP Patient Position: Sitting)  Pulse (!) 56  Resp 16  Ht 5' 8\" (1.727 m)  Wt 179 lb (81.2 kg)  SpO2 94%  BMI 27.22 kg/m2    General: Alert and Ox2, speech is slow and halting. HEENT:  NC/AT, EOMI, OP: clear  Neck:  Supple, no adenopathy, JVD, mass or bruit  Chest:  Clear to Ausculation, without wheezes, rales, rubs or ronchi  Cardiac: RRR  Abdomen:  +BS, soft, nontender without palpable HSM  Extremities:  No cyanosis, clubbing or edema  Neurologic:  Ambulatory without assist, CN 2-12 grossly intact. Moves all extremities. Markedly bradykinetic. En bloc turning. Skin: no rash  Lymphadenopathy: no cervical or supraclavicular nodes      ASSESSMENT AND PLAN:     1.  HTN is well controlled  2. PD:  Has Neuro followup scheduled  3. AMS: no recurrence    No orders of the defined types were placed in this encounter.       Jose Alejandro Hernandez MD, [address]

## 2018-06-08 NOTE — MR AVS SNAPSHOT
[address] [facility] 
 
 
 [address] [address] [phone] Patient: Niko Souza MRN: [id number] CRF:[birth year] Visit Information Date & Time Provider Department Dept. Phone Encounter #  
 6/8/2018  2:00 PM Amelie Castro, Bridgett Fuller [id number] Your Appointments 6/19/2018 10:30 AM  
Follow Up with Yari Torres NP 1991 [facility] ([facility]) Appt Note: 4mo f/up parkinson's/ memory cr  
 [place] [address] [facility] 99 [address] [phone]  
  
   
 [place] [address] [facility] [address] [id number] [address] Upcoming Health Maintenance Date Due ZOSTER VACCINE AGE 60> 4/5/2002 Pneumococcal 65+ Low/Medium Risk (2 of 2 - PPSV23) 6/10/2017 Influenza Age 5 to Adult 8/1/2018 MEDICARE YEARLY EXAM 5/2/2019 GLAUCOMA SCREENING Q2Y 6/22/2019 DTaP/Tdap/Td series (2 - Td) 1/11/2028 Allergies as of 6/8/2018  Review Complete On: 6/8/2018 By: Nayeli Engle RN No Known Allergies Current Immunizations  Never Reviewed Name Date Influenza High Dose Vaccine PF 10/24/2017 Influenza Vaccine 11/12/2013 Pneumococcal Conjugate (PCV-13) 6/10/2016  3:02 PM  
  
 Not reviewed this visit Vitals BP Pulse Resp Height(growth percentile) Weight(growth percentile) SpO2  
 94/44 (BP 1 Location: Left arm, BP Patient Position: Sitting) (!) 56 16 5' 8\" (1.727 m) 179 lb (81.2 kg) 94% BMI Smoking Status 27.22 kg/m2 Former Smoker BMI and BSA Data Body Mass Index Body Surface Area  
 27.22 kg/m 2 1.97 m 2 [facility] [phone] [phone] Your Updated Medication List  
  
   
This list is accurate as of 6/8/18  2:32 PM.  Always use your most recent med list.  
  
  
  
  
 ALTACE 10 mg capsule Generic drug:  ramipril Take 10 mg by mouth daily. amLODIPine 2.5 mg tablet Commonly known as:  [place] Take 1 Tab by mouth daily. aspirin 325 mg tablet Commonly known as:  ASPIRIN Take 162 mg by mouth daily. carbidopa-levodopa  mg per tablet Commonly known as:  SINEMET  
take 1 tablet by mouth three times a day AT 8AM,12 NOON AND 4PM  
  
 furosemide 20 mg tablet Commonly known as:  LASIX Take 1 Tab by mouth every other day. LIPITOR 40 mg tablet Generic drug:  atorvastatin Take  by mouth daily. metoprolol tartrate 50 mg tablet Commonly known as:  LOPRESSOR 50 mg two (2) times a day. rasagiline 1 mg tablet Commonly known as:  AZILECT  
take 1 tablet by mouth once daily  
  
 rivastigmine 13.3 mg/24 hour patch Commonly known as:  EXELON  
apply 1 patch BY TRANSDERMAL ROUTE DAILY Patient Instructions If you have any questions regarding [employer], you may call [employer] support at [phone]. Introducing [facility]! Christopher Ramirez introduces [employer] patient portal. Now you can access parts of your medical record, email your doctor's office, and request medication refills online. 1. In your internet browser, go to [email or web address] 2. Click on the First Time User? Click Here link in the Sign In box. You will see the New Member Sign Up page. 3. Enter your [employer] Access Code exactly as it appears below. You will not need to use this code after youve completed the sign-up process. If you do not sign up before the expiration date, you must request a new code. · [employer] Access Code: [email or web address] Expires: 7/30/2018  3:50 PM 
 
4. Enter the last four digits of your Social Security Number (xxxx) and Date of Birth (mm/dd/yyyy) as indicated and click Submit. You will be taken to the next sign-up page. 5. Create a [employer] ID. This will be your [employer] login ID and cannot be changed, so think of one that is secure and easy to remember. 6. Create a [employer] password. You can change your password at any time. 7. Enter your Password Reset Question and Answer. This can be used at a later time if you forget your password. 8. Enter your e-mail address. You will receive e-mail notification when new information is available in [address] 9. Click Sign Up. You can now view and download portions of your medical record. 10. Click the Download Summary menu link to download a portable copy of your medical information. If you have questions, please visit the Frequently Asked Questions section of the [employer] website. Remember, [employer] is NOT to be used for urgent needs. For medical emergencies, dial 911. Now available from your iPhone and Android! Please provide this summary of care documentation to your next provider. Your primary care clinician is listed as Diomedes Goodwin. If you have any questions after today's visit, please call [phone].

## 2018-06-19 NOTE — PATIENT INSTRUCTIONS

## 2018-06-19 NOTE — PROGRESS NOTES
Date:  18     Name:  Oj Todd  :  1940  MRN:  [id number]     PCP:  Ministerio Corona MD    Chief Complaint   Patient presents with    Follow-up     parkinson's disease/memory      HISTORY OF PRESENT ILLNESS: Follow-up for Parkinson's disease and related dementia. Has been having more difficulty getting into the bed and is needing help to get situated. He goes to bed around 10 and the last dose of Sinemet is around 4pm or 5p. He did have some swelling in his lower extremities and confusion with a high blood pressure. His cardiologist took him off of the Norvasc because he thought it was too low. Then he had this issue with it being too high. PCP put him back on a lower dose of the Norvasc. He was also put on a little Lasix every other day to help with the swelling. He did have a fall at one of the exercise classes where he looked like he was trying to sit down but there was no chair behind him. He has not had any delusions or hallucinations. They both indicate that he does sleep fairly well. There is no evidence for REM sleep behavior disorder. He does fall asleep fairly easily if he is not doing anything such as just sitting in a chair or sitting in a car. He does not drive at this point. He does continue to go to his exercise class three times a week. His wife feels that he looks a bit stiffer when he is walking. He may also have some additional shuffling. Memory may be just a little bit worse as she has found that she has to repeat herself and telling him things some of the time. Current Outpatient Prescriptions   Medication Sig    rasagiline (AZILECT) 1 mg tablet take 1 tablet by mouth once daily    amLODIPine (NORVASC) 2.5 mg tablet Take 1 Tab by mouth daily.     carbidopa-levodopa (SINEMET)  mg per tablet take 1 tablet by mouth three times a day AT 8AM,12 NOON AND 4PM    rivastigmine (EXELON) 13.3 mg/24 hour patch apply 1 patch BY TRANSDERMAL ROUTE DAILY    furosemide (LASIX) 20 mg tablet Take 1 Tab by mouth every other day.  aspirin (ASPIRIN) 325 mg tablet Take 162 mg by mouth daily.  metoprolol (LOPRESSOR) 50 mg tablet 50 mg two (2) times a day.  atorvastatin (LIPITOR) 40 mg tablet Take  by mouth daily.  ramipril (ALTACE) 10 mg capsule Take 10 mg by mouth daily. No current facility-administered medications for this visit. No Known Allergies  Past Medical History:   Diagnosis Date    ASCVD (arteriosclerotic cardiovascular disease)     Atrial fibrillation with [facility]) May 31, 2016    [place]:  Dr Guy Larios BPH (benign prostatic hypertrophy)     Fatigue     daytime    Hyperlipidemia     Hypertension     Pulmonary hypertension (HCC)      Past Surgical History:   Procedure Laterality Date    HX TONSILLECTOMY       Social History     Social History    Marital status:      Spouse name: N/A    Number of children: N/A    Years of education: N/A     Occupational History    Not on file.      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    Blood Transfusions No    Caffeine Concern No    Occupational Exposure No    Hobby Hazards No    Sleep Concern No    Stress Concern No    Weight Concern No    Special Diet No    Back Care No    Exercise Yes     [employer] [place] Program for Parkinsons 3x/wk    Bike Helmet No    Seat Belt Yes    Self-Exams No     Social History Narrative     Family History   Problem Relation Age of Onset    Cancer Mother      lymphoma    Heart Disease Father      MI       PHYSICAL EXAMINATION:    Visit Vitals    /68    Pulse (!) 55    Resp 20    Ht 5' 8\" (1.727 m)    Wt 81.2 kg (179 lb)    SpO2 96%    BMI 27.22 kg/m2     General:  Well defined, nourished, and groomed individual in no acute distress.     Neck:   Supple, nontender, no bruits, no pain with resistance to active range of motion.     Heart:  Regular rate and rhythm, no murmurs, rub, or gallop.  Normal S1S2. Lungs:  Clear to auscultation bilaterally with equal chest expansion, no cough, no wheeze  Musculoskeletal:  Extremities revealed no edema and had full range of motion of joints.     Psych:  Flat affect and mood. Mask like face.       NEUROLOGICAL EXAMINATION:      Mental Status:   Alert and oriented to person, place and time. He is able to discuss some of his health issues and he is still able to articulate how he feels without difficulty.      Cranial Nerves:     II, III, IV, VI:  Visual acuity grossly intact. Visual fields are normal.     Pupils are equal, round, and reactive to light and accommodation.     Extra-ocular movements are full and fluid.  Fundoscopic exam was benign, no ptosis or nystagmus.    V-XII:   Hearing is grossly intact.  Facial features are symmetric, with normal sensation and strength.  The palate rises symmetrically and the tongue protrudes midline.  Sternocleidomastoids 5/5.        Motor Examination:     Normal tone, bulk, and strength, 5/5 muscle strength throughout.  No noticeable cogwheel rigidity today      Coordination:  Finger to nose and rapid arm movement testing demonstrated bradykinesia and loss of fine motor control without rest or intention tremor      Gait and Station:  small steps and a bit more shuffled and absent bilateral arm swing.  No pronator drift.   No muscle wasting or fasiculations noted.        Reflexes:  DTRs 2+ throughout.   Positive snout reflex    ASSESSMENT AND PLAN    ICD-10-CM ICD-9-CM    1. Parkinson's disease ([facility] G20 332.0 rasagiline (AZILECT) 1 mg tablet      carbidopa-levodopa (SINEMET)  mg per tablet   2.  Dementia due to Parkinson's disease without behavioral disturbance ([facility]) G20 332.0 rivastigmine (EXELON) 13.3 mg/24 hour patch    F02.80 294.10      On today's exam, he does appear to be a bit more bradykinetic with some increase in left-sided dysmetria and increased shuffling. They both also indicated that he has been having more difficulty getting into bed at night. I think this may be related to when he is taking his last dose of Sinemet for the evening which is likely too early to last him until he does go to bed. As such, I did recommend the increase the Sinemet to 1-1/2 tablets 4 times a day at 4 hour intervals. He will continue taking Azilect as previously prescribed. Per his wife, he may have had some increase in memory loss and comprehension as he seems to have more difficulty following directions. Some of this is to be expected given the dementia process. He does continue to be active and he was encouraged to do so. He will continue taking Exelon patch as previously prescribed. Follow-up in 3 months or sooner if needed. Lizzy Peterson Rout

## 2018-06-19 NOTE — PROGRESS NOTES
Parkinson's disease- still goes to exercise class 3 times a week, 1 hour per his wife she thinks he is stiffer but he says he doesn't feel that way   Memory- she did state she does have to repeat herself in telling him things some days, she misses the conversation with him since his mind hasn't been well

## 2018-06-19 NOTE — MR AVS SNAPSHOT
[address] [facility] [id number] [phone] Patient: Russell France MRN: [id number] TTH:[id number] Visit Information Date & Time Provider Department Dept. Phone Encounter #  
 6/19/2018 10:30 AM Slime Barber NP [facility] [phone] [id number] Your Appointments 12/6/2018 11:00 AM  
ESTABLISHED PATIENT with Mercedes Garcia MD  
[facility] 38 ([facility]) Appt Note: 6 MO F/U  
 [address] [facility] [address] [facility] [address] [phone]  
  
   
 [address] [facility] [address] [facility] [address] Upcoming Health Maintenance Date Due ZOSTER VACCINE AGE 60> 4/5/2002 Pneumococcal 65+ Low/Medium Risk (2 of 2 - PPSV23) 6/10/2017 Influenza Age 5 to Adult 8/1/2018 MEDICARE YEARLY EXAM 5/2/2019 GLAUCOMA SCREENING Q2Y 6/22/2019 DTaP/Tdap/Td series (2 - Td) 1/11/2028 Allergies as of 6/19/2018  Review Complete On: 6/19/2018 By: Slime Barber NP No Known Allergies Current Immunizations  Never Reviewed Name Date Influenza High Dose Vaccine PF 10/24/2017 Influenza Vaccine 11/12/2013 Pneumococcal Conjugate (PCV-13) 6/10/2016  3:02 PM  
  
 Not reviewed this visit You Were Diagnosed With   
  
 Codes Comments Parkinson's disease ([facility]    -  Primary ICD-10-CM: G20 
ICD-9-CM: 332.0 Dementia due to Parkinson's disease without behavioral disturbance ([facility]     ICD-10-CM: G20, F02.80 ICD-9-CM: 332.0, 294.10 Vitals BP Pulse Resp Height(growth percentile) Weight(growth percentile) SpO2  
 104/68 (!) 55 20 5' 8\" (1.727 m) 179 lb (81.2 kg) 96% BMI Smoking Status 27.22 kg/m2 Former Smoker Vitals History BMI and BSA Data Body Mass Index Body Surface Area  
 27.22 kg/m 2 1.97 m 2 Preferred Pharmacy Pharmacy Name Phone [facility] [phone] [address] [phone] Your Updated Medication List  
  
   
This list is accurate as of 6/19/18 12:08 PM.  Always use your most recent med list. ALTACE 10 mg capsule Generic drug:  ramipril Take 10 mg by mouth daily. amLODIPine 2.5 mg tablet Commonly known as:  Unknown [place] Take 1 Tab by mouth daily. aspirin 325 mg tablet Commonly known as:  ASPIRIN Take 162 mg by mouth daily. carbidopa-levodopa  mg per tablet Commonly known as:  SINEMET Take 1.5 Tabs by mouth four (4) times daily. At four hour intervals  
  
 furosemide 20 mg tablet Commonly known as:  LASIX Take 1 Tab by mouth every other day. LIPITOR 40 mg tablet Generic drug:  atorvastatin Take  by mouth daily. metoprolol tartrate 50 mg tablet Commonly known as:  LOPRESSOR 50 mg two (2) times a day. rasagiline 1 mg tablet Commonly known as:  AZILECT  
take 1 tablet by mouth once daily  
  
 rivastigmine 13.3 mg/24 hour patch Commonly known as:  EXELON  
apply 1 patch BY TRANSDERMAL ROUTE DAILY Prescriptions Sent to Pharmacy Refills  
 rasagiline (AZILECT) 1 mg tablet 3 Sig: take 1 tablet by mouth once daily Class: Normal  
 Pharmacy: [facility] [id number] [place] [address] Ph #: [phone]  
 carbidopa-levodopa (SINEMET)  mg per tablet 3 Sig: Take 1.5 Tabs by mouth four (4) times daily. At four hour intervals Class: Normal  
 Pharmacy: [facility] [address] [place] [address] Ph #: [phone] Route: Oral  
 rivastigmine (EXELON) 13.3 mg/24 hour patch 3 Sig: apply 1 patch BY TRANSDERMAL ROUTE DAILY Class: Normal  
 Pharmacy: [facility] [id number] [place] [address] Ph #: [phone] Patient Instructions A Healthy Lifestyle: Care Instructions Your Care Instructions A healthy lifestyle can help you feel good, stay at a healthy weight, and have plenty of energy for both work and play. A healthy lifestyle is something you can share with your whole family. A healthy lifestyle also can lower your risk for serious health problems, such as high blood pressure, heart disease, and diabetes. You can follow a few steps listed below to improve your health and the health of your family. Follow-up care is a key part of your treatment and safety. Be sure to make and go to all appointments, and call your doctor if you are having problems. It's also a good idea to know your test results and keep a list of the medicines you take. How can you care for yourself at home? · Do not eat too much sugar, fat, or fast foods. You can still have dessert and treats now and then. The goal is moderation. · Start small to improve your eating habits. Pay attention to portion sizes, drink less juice and soda pop, and eat more fruits and vegetables. ¨ Eat a healthy amount of food. A 3-ounce serving of meat, for example, is about the size of a deck of cards. Fill the rest of your plate with vegetables and whole grains. ¨ Limit the amount of soda and sports drinks you have every day. Drink more water when you are thirsty. ¨ Eat at least 5 servings of fruits and vegetables every day. It may seem like a lot, but it is not hard to reach this goal. A serving or helping is 1 piece of fruit, 1 cup of vegetables, or 2 cups of leafy, raw vegetables. Have an apple or some carrot sticks as an afternoon snack instead of a candy bar. Try to have fruits and/or vegetables at every meal. 
· Make exercise part of your daily routine. You may want to start with simple activities, such as walking, bicycling, or slow swimming. Try to be active 30 to 60 minutes every day. You do not need to do all 30 to 60 minutes all at once.  For example, you can exercise 3 times a day for 10 or 20 minutes. Moderate exercise is safe for most people, but it is always a good idea to talk to your doctor before starting an exercise program. 
· Keep moving. Patric Blotter the lawn, work in the garden, or [employer]. Take the stairs instead of the elevator at work. · If you smoke, quit. People who smoke have an increased risk for heart attack, stroke, cancer, and other lung illnesses. Quitting is hard, but there are ways to boost your chance of quitting tobacco for good. ¨ Use nicotine gum, patches, or lozenges. ¨ Ask your doctor about stop-smoking programs and medicines. ¨ Keep trying. In addition to reducing your risk of diseases in the future, you will notice some benefits soon after you stop using tobacco. If you have shortness of breath or asthma symptoms, they will likely get better within a few weeks after you quit. · Limit how much alcohol you drink. Moderate amounts of alcohol (up to 2 drinks a day for men, 1 drink a day for women) are okay. But drinking too much can lead to liver problems, high blood pressure, and other health problems. Family health If you have a family, there are many things you can do together to improve your health. · Eat meals together as a family as often as possible. · Eat healthy foods. This includes fruits, vegetables, lean meats and dairy, and whole grains. · Include your family in your fitness plan. Most people think of activities such as jogging or tennis as the way to fitness, but there are many ways you and your family can be more active. Anything that makes you breathe hard and gets your heart pumping is exercise. Here are some tips: 
¨ Walk to do errands or to take your child to school or the bus. ¨ Go for a family bike ride after dinner instead of watching TV. Where can you learn more? Go to http://cassie-kevin.info/. Enter X250 in the search box to learn more about \"A Healthy Lifestyle: Care Instructions. \" Current as of: May 12, 2017 Content Version: 11.4 © 4662-3469 Healthwise, [employer]. Care instructions adapted under license by [employer] (which disclaims liability or warranty for this information). If you have questions about a medical condition or this instruction, always ask your healthcare professional. [address] any warranty or liability for your use of this information. Introducing [facility]! [facility] introduces YouEye patient portal. Now you can access parts of your medical record, email your doctor's office, and request medication refills online. 1. In your internet browser, go to [email or web address] 2. Click on the First Time User? Click Here link in the Sign In box. You will see the New Member Sign Up page. 3. Enter your YouEye Access Code exactly as it appears below. You will not need to use this code after youve completed the sign-up process. If you do not sign up before the expiration date, you must request a new code. · YouEye Access Code: [email or web address] Expires: 7/30/2018  3:50 PM 
 
4. Enter the last four digits of your Social Security Number (xxxx) and Date of Birth (mm/dd/yyyy) as indicated and click Submit. You will be taken to the next sign-up page. 5. Create a YouEye ID. This will be your YouEye login ID and cannot be changed, so think of one that is secure and easy to remember. 6. Create a YouEye password. You can change your password at any time. 7. Enter your Password Reset Question and Answer. This can be used at a later time if you forget your password. 8. Enter your e-mail address. You will receive e-mail notification when new information is available in [address] 9. Click Sign Up. You can now view and download portions of your medical record. 10. Click the Download Summary menu link to download a portable copy of your medical information. If you have questions, please visit the Frequently Asked Questions section of the [employer] website. Remember, [employer] is NOT to be used for urgent needs. For medical emergencies, dial 911. Now available from your iPhone and Android! Please provide this summary of care documentation to your next provider. Your primary care clinician is listed as Marquita Schmitz. If you have any questions after today's visit, please call [phone].

## 2021-03-20 NOTE — PROGRESS NOTES
Date:  10/17/17     Name:  Darshana Lee  :  1940  MRN:  [id number]     PCP:  Asiya Gonzalez MD    Chief Complaint   Patient presents with    Parkinsons Disease     HISTORY OF PRESENT ILLNESS: Follow up for Parkinson Disease and related dementia. He is accompanied by his wife. At his last visit, the Exelon Patch was increased to 13.3mg. Due to intolerance to Namenda, he was maintained on monotherapy. He was to continue with Sinemet and Azilect as before. Memory seems about the same. It is sometimes hard to explain more complex things like balancing the check book. His long term memory is fine. The short term memory is sometimes better and sometimes it's worse. Some of this might have something to do with his hearing loss. He is still driving some but his wife restricts him to just the immediate area, within a couple of miles. Except as noted above, denies  fever, chills, cough. No CP or SOB. No dysuria, loss of bowel or bladder control. No Weight loss. Appetite good. Sleeping well. No sweats. No edema. No bruising or bleeding. No nausea or vomit. No diarrhea. No frequency, urgency, No depressive sxs. No anxiety. Denies sore throat, nasal congestion, nasal discharge, epistaxis, tinnitus, hearing loss, back pain, muscle pain, or joint pain. Current Outpatient Prescriptions   Medication Sig    rasagiline (AZILECT) 1 mg tablet Take 1 Tab by mouth daily.  carbidopa-levodopa (SINEMET)  mg per tablet Take 1 Tab by mouth three (3) times daily. At 8am, 12noon, 4pm    rivastigmine (EXELON) 13.3 mg/24 hour patch 1 Patch by TransDERmal route daily.  aspirin (ASPIRIN) 325 mg tablet Take 162 mg by mouth daily.  amLODIPine (NORVASC) 5 mg tablet Take 5 mg by mouth daily.  metoprolol (LOPRESSOR) 50 mg tablet 50 mg two (2) times a day.  chlorthalidone (HYGROTEN) 25 mg tablet Take  by mouth daily.  Take 1/2 tab daily    atorvastatin (LIPITOR) 40 mg tablet Take  by mouth daily.    ramipril (ALTACE) 10 mg capsule Take 10 mg by mouth daily. No current facility-administered medications for this visit. No Known Allergies  Past Medical History:   Diagnosis Date    ASCVD (arteriosclerotic cardiovascular disease)     Atrial fibrillation with R[facility]) May 31, 2016    [place] Sully:  Dr Jennifer Owens BPH (benign prostatic hypertrophy)     Fatigue     daytime    Hyperlipidemia     Hypertension     Pulmonary hypertension      Past Surgical History:   Procedure Laterality Date    HX TONSILLECTOMY       Social History     Social History    Marital status:      Spouse name: N/A    Number of children: N/A    Years of education: N/A     Occupational History    Not on file.      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    Blood Transfusions No    Caffeine Concern No    Occupational Exposure No    Hobby Hazards No    Sleep Concern No    Stress Concern No    Weight Concern No    Special Diet No    Back Care No    Exercise Yes     CallmyName Program for Parkinsons 3x/wk    Bike Helmet No    Seat Belt Yes    Self-Exams No     Social History Narrative     Family History   Problem Relation Age of Onset    Cancer Mother      lymphoma    Heart Disease Father      MI       PHYSICAL EXAMINATION:    Visit Vitals    /82 (BP 1 Location: Left arm, BP Patient Position: Sitting)    Pulse (!) 56    Temp 97.6 °F (36.4 °C) (Oral)    Resp 18    Ht 5' 8\" (1.727 m)    Wt 80.9 kg (178 lb 4.8 oz)    SpO2 99%    BMI 27.11 kg/m2     General:  Well defined, nourished, and groomed individual in no acute distress.     Neck:   Supple, nontender, no bruits, no pain with resistance to active range of motion.     Heart:  Regular rate and rhythm, no murmurs, rub, or gallop.  Normal S1S2. Lungs:  Clear to auscultation bilaterally with equal chest expansion, no cough, no wheeze  Musculoskeletal:  Extremities revealed no edema and had full range of motion of joints.     Psych:  Flat affect and mood. Mask like face.       NEUROLOGICAL EXAMINATION:      Mental Status:   Alert and oriented to person, place and time.       Cranial Nerves:     II, III, IV, VI:  Visual acuity grossly intact. Visual fields are normal.     Pupils are equal, round, and reactive to light and accommodation.     Extra-ocular movements are full and fluid.  Fundoscopic exam was benign, no ptosis or nystagmus.    V-XII:   Hearing is grossly intact.  Facial features are symmetric, with normal sensation and strength.  The palate rises symmetrically and the tongue protrudes midline.  Sternocleidomastoids 5/5.        Motor Examination:     Normal tone, bulk, and strength, 5/5 muscle strength throughout.  Mild bilateral cogwheel rgidity        Coordination:  Finger to nose and rapid arm movement testing demonstrated bradykinesia and loss of fine motor control without rest or intention tremor      Gait and Station:  small steps but not as shuffled and absent bilateral arm swing.  No pronator drift.   No muscle wasting or fasiculations noted.        Reflexes:  DTRs 2+ throughout.        ASSESSMENT AND PLAN    ICD-10-CM ICD-9-CM    1. Parkinson's disease ([facility] G20 332.0 rasagiline (AZILECT) 1 mg tablet      carbidopa-levodopa (SINEMET)  mg per tablet   2. Dementia due to Parkinson's disease without behavioral disturbance ([facility]) G20 332.0 rivastigmine (EXELON) 13.3 mg/24 hour patch    F02.80 294.10 phosphatidylse-omega-3-dha-epa (VAYACOG) 100-19.5-6.5 mg cap     Parkinson's disease and related dementia appears to be stable. He will continue taking Sinemet and Azilect as previously prescribed. Will continue Exelon patch as monotherapy for his memory issues.   We discussed additional therapy with a potential supplement. They were willing to see if it would be covered by their insurance or alternatively affordable if they had to pay for it out of pocket. They were provided with a prescription for Vayacog. Purpose of potential side effects were discussed and he has verbalized understanding. Follow-up in 4 months or sooner if needed. Lizzy Kingsley Implemented All Fall Risk Interventions:  [place] to call system. Call bell, personal items and telephone within reach. Instruct patient to call for assistance. Room bathroom lighting operational. Non-slip footwear when patient is off stretcher. Physically safe environment: no spills, clutter or unnecessary equipment. Stretcher in lowest position, wheels locked, appropriate side rails in place. Provide visual cue, wrist band, yellow gown, etc. Monitor gait and stability. Monitor for mental status changes and reorient to person, place, and time. Review medications for side effects contributing to fall risk. Reinforce activity limits and safety measures with patient and family.